# Patient Record
Sex: MALE | Race: WHITE | Employment: FULL TIME | URBAN - METROPOLITAN AREA
[De-identification: names, ages, dates, MRNs, and addresses within clinical notes are randomized per-mention and may not be internally consistent; named-entity substitution may affect disease eponyms.]

---

## 2023-09-06 ENCOUNTER — TELEPHONE (OUTPATIENT)
Dept: PRIMARY CARE | Facility: CLINIC | Age: 63
End: 2023-09-06
Payer: COMMERCIAL

## 2023-09-06 NOTE — TELEPHONE ENCOUNTER
Pt scheduled for comp pe on 11/9  BW    Pt will use our lab however requests order be mailed to him as well

## 2023-09-07 DIAGNOSIS — Z00.00 HEALTH MAINTENANCE EXAMINATION: ICD-10-CM

## 2023-09-07 PROBLEM — H91.10 PRESBYCUSIS: Status: ACTIVE | Noted: 2023-09-07

## 2023-09-07 PROBLEM — M54.12 RADICULITIS, CERVICAL: Status: ACTIVE | Noted: 2023-09-07

## 2023-09-07 PROBLEM — J01.00 ACUTE MAXILLARY SINUSITIS: Status: ACTIVE | Noted: 2023-09-07

## 2023-09-07 PROBLEM — H61.22 EXCESSIVE CERUMEN IN EAR CANAL, LEFT: Status: ACTIVE | Noted: 2023-09-07

## 2023-09-07 PROBLEM — R10.32 ABDOMINAL PAIN, LLQ (LEFT LOWER QUADRANT): Status: ACTIVE | Noted: 2023-09-07

## 2023-09-07 PROBLEM — E78.5 HYPERLIPIDEMIA: Status: ACTIVE | Noted: 2023-09-07

## 2023-09-07 PROBLEM — N40.0 BPH (BENIGN PROSTATIC HYPERPLASIA): Status: ACTIVE | Noted: 2023-09-07

## 2023-09-07 PROBLEM — J40 BRONCHITIS: Status: ACTIVE | Noted: 2023-09-07

## 2023-09-07 PROBLEM — R42 VERTIGO: Status: ACTIVE | Noted: 2023-09-07

## 2023-09-07 PROBLEM — H93.13 BILATERAL TINNITUS: Status: ACTIVE | Noted: 2023-09-07

## 2023-09-07 PROBLEM — J30.9 ALLERGIC RHINITIS: Status: ACTIVE | Noted: 2023-09-07

## 2023-09-07 PROBLEM — K57.92 ACUTE DIVERTICULITIS: Status: ACTIVE | Noted: 2023-09-07

## 2023-09-07 PROBLEM — H90.3 BILATERAL SENSORINEURAL HEARING LOSS: Status: ACTIVE | Noted: 2023-09-07

## 2023-09-07 PROBLEM — L02.92 BOIL: Status: ACTIVE | Noted: 2023-09-07

## 2023-09-07 PROBLEM — L82.1 SEBORRHEIC KERATOSIS: Status: ACTIVE | Noted: 2023-09-07

## 2023-09-07 RX ORDER — SIMVASTATIN 40 MG/1
1 TABLET, FILM COATED ORAL DAILY
COMMUNITY
Start: 2015-04-20 | End: 2023-10-03

## 2023-09-11 ENCOUNTER — OFFICE VISIT (OUTPATIENT)
Dept: PRIMARY CARE | Facility: CLINIC | Age: 63
End: 2023-09-11
Payer: COMMERCIAL

## 2023-09-11 VITALS
OXYGEN SATURATION: 98 % | BODY MASS INDEX: 30.1 KG/M2 | WEIGHT: 215 LBS | HEART RATE: 72 BPM | TEMPERATURE: 97.1 F | HEIGHT: 71 IN | DIASTOLIC BLOOD PRESSURE: 98 MMHG | SYSTOLIC BLOOD PRESSURE: 159 MMHG

## 2023-09-11 DIAGNOSIS — K57.92 ACUTE DIVERTICULITIS: Primary | ICD-10-CM

## 2023-09-11 PROCEDURE — 99213 OFFICE O/P EST LOW 20 MIN: CPT | Performed by: STUDENT IN AN ORGANIZED HEALTH CARE EDUCATION/TRAINING PROGRAM

## 2023-09-11 RX ORDER — CIPROFLOXACIN 500 MG/1
500 TABLET ORAL 2 TIMES DAILY
Qty: 14 TABLET | Refills: 0 | Status: SHIPPED | OUTPATIENT
Start: 2023-09-11 | End: 2023-09-18

## 2023-09-11 RX ORDER — METRONIDAZOLE 500 MG/1
500 TABLET ORAL 3 TIMES DAILY
Qty: 21 TABLET | Refills: 0 | Status: SHIPPED | OUTPATIENT
Start: 2023-09-11 | End: 2023-09-18

## 2023-09-11 ASSESSMENT — ENCOUNTER SYMPTOMS
CHILLS: 0
DIZZINESS: 0
ARTHRALGIAS: 0
VOMITING: 0
NAUSEA: 0
SHORTNESS OF BREATH: 0
RHINORRHEA: 0
PALPITATIONS: 0
CONSTIPATION: 0
COUGH: 0
LIGHT-HEADEDNESS: 0
ABDOMINAL PAIN: 1
HEADACHES: 0
FEVER: 0
DIARRHEA: 0
MYALGIAS: 0
FATIGUE: 0

## 2023-09-11 ASSESSMENT — PATIENT HEALTH QUESTIONNAIRE - PHQ9
2. FEELING DOWN, DEPRESSED OR HOPELESS: NOT AT ALL
SUM OF ALL RESPONSES TO PHQ9 QUESTIONS 1 AND 2: 0
1. LITTLE INTEREST OR PLEASURE IN DOING THINGS: NOT AT ALL

## 2023-09-11 NOTE — PROGRESS NOTES
"The Subjective   Patient ID: Praful Hilton is a 63 y.o. male who presents for diverticulitits (X Tumadeline).    HPI     He has been having a flare up of diverticulitis about a week ago.  He was on vacation. He states that he overate and thinks that is what did it.  The next day it was worsening. He did take an anibal seltzer and then was fasting a bit which has helped.  Currently it is nearly gone at this time.  Still pain in the lower left abdomen. Had been having more gas pressure which seems to have gotten a bit better.    He had diverticulitis several years ago and was diagnosed with full testing and then antibiotics.  He had it again a few years ago and was treated with antibiotics.    Review of Systems   Constitutional:  Negative for chills, fatigue and fever.   HENT:  Negative for congestion and rhinorrhea.    Respiratory:  Negative for cough and shortness of breath.    Cardiovascular:  Negative for chest pain and palpitations.   Gastrointestinal:  Positive for abdominal pain. Negative for constipation, diarrhea, nausea and vomiting.   Musculoskeletal:  Negative for arthralgias and myalgias.   Neurological:  Negative for dizziness, light-headedness and headaches.       Objective   BP (!) 159/98   Pulse 72   Temp 36.2 °C (97.1 °F)   Ht 1.803 m (5' 11\")   Wt 97.5 kg (215 lb)   SpO2 98%   BMI 29.99 kg/m²     Physical Exam  Vitals and nursing note reviewed.   Constitutional:       General: He is not in acute distress.     Appearance: Normal appearance. He is normal weight. He is not ill-appearing or toxic-appearing.   HENT:      Head: Normocephalic and atraumatic.   Cardiovascular:      Rate and Rhythm: Normal rate and regular rhythm.      Heart sounds: Normal heart sounds.   Pulmonary:      Effort: Pulmonary effort is normal.      Breath sounds: Normal breath sounds.   Abdominal:      General: Abdomen is flat. Bowel sounds are normal. There is no distension.      Palpations: Abdomen is soft.      Tenderness: " VIEW ALL There is abdominal tenderness (left lower quadrant mild). There is no guarding or rebound.   Neurological:      Mental Status: He is alert.         Assessment/Plan   Problem List Items Addressed This Visit       Acute diverticulitis - Primary    Relevant Medications    metroNIDAZOLE (Flagyl) 500 mg tablet    ciprofloxacin (Cipro) 500 mg tablet     Patient likely with episode of acute diverticulitis.  He is penicillin allergic.  Given and started on course of metronidazole with ciprofloxacin.  Patient may wait to start the medication as he has been making improvements on his own.  Discussed other dietary and lifestyle modifications as well.  Discussed and recommended that he take extra probiotics.  He will continue with regular follow-up.

## 2023-10-03 DIAGNOSIS — E78.5 HYPERLIPIDEMIA, UNSPECIFIED HYPERLIPIDEMIA TYPE: Primary | ICD-10-CM

## 2023-10-03 RX ORDER — SIMVASTATIN 40 MG/1
40 TABLET, FILM COATED ORAL DAILY
Qty: 90 TABLET | Refills: 3 | Status: SHIPPED | OUTPATIENT
Start: 2023-10-03

## 2023-10-31 ENCOUNTER — LAB (OUTPATIENT)
Dept: LAB | Facility: LAB | Age: 63
End: 2023-10-31
Payer: COMMERCIAL

## 2023-10-31 DIAGNOSIS — Z00.00 HEALTH MAINTENANCE EXAMINATION: ICD-10-CM

## 2023-10-31 PROCEDURE — 80061 LIPID PANEL: CPT

## 2023-10-31 PROCEDURE — 80053 COMPREHEN METABOLIC PANEL: CPT

## 2023-10-31 PROCEDURE — 84153 ASSAY OF PSA TOTAL: CPT

## 2023-10-31 PROCEDURE — 85025 COMPLETE CBC W/AUTO DIFF WBC: CPT

## 2023-10-31 PROCEDURE — 36415 COLL VENOUS BLD VENIPUNCTURE: CPT

## 2023-10-31 PROCEDURE — 84443 ASSAY THYROID STIM HORMONE: CPT

## 2023-11-01 LAB
ALBUMIN SERPL BCP-MCNC: 4.3 G/DL (ref 3.4–5)
ALP SERPL-CCNC: 62 U/L (ref 33–136)
ALT SERPL W P-5'-P-CCNC: 24 U/L (ref 10–52)
ANION GAP SERPL CALC-SCNC: 17 MMOL/L (ref 10–20)
AST SERPL W P-5'-P-CCNC: 20 U/L (ref 9–39)
BASOPHILS # BLD AUTO: 0.03 X10*3/UL (ref 0–0.1)
BASOPHILS NFR BLD AUTO: 0.5 %
BILIRUB SERPL-MCNC: 0.8 MG/DL (ref 0–1.2)
BUN SERPL-MCNC: 15 MG/DL (ref 6–23)
CALCIUM SERPL-MCNC: 9.7 MG/DL (ref 8.6–10.6)
CHLORIDE SERPL-SCNC: 103 MMOL/L (ref 98–107)
CHOLEST SERPL-MCNC: 178 MG/DL (ref 0–199)
CHOLESTEROL/HDL RATIO: 3
CO2 SERPL-SCNC: 23 MMOL/L (ref 21–32)
CREAT SERPL-MCNC: 0.93 MG/DL (ref 0.5–1.3)
EOSINOPHIL # BLD AUTO: 0.05 X10*3/UL (ref 0–0.7)
EOSINOPHIL NFR BLD AUTO: 0.9 %
ERYTHROCYTE [DISTWIDTH] IN BLOOD BY AUTOMATED COUNT: 12.4 % (ref 11.5–14.5)
GFR SERPL CREATININE-BSD FRML MDRD: >90 ML/MIN/1.73M*2
GLUCOSE SERPL-MCNC: 107 MG/DL (ref 74–99)
HCT VFR BLD AUTO: 46.3 % (ref 41–52)
HDLC SERPL-MCNC: 58.7 MG/DL
HGB BLD-MCNC: 15.1 G/DL (ref 13.5–17.5)
IMM GRANULOCYTES # BLD AUTO: 0.02 X10*3/UL (ref 0–0.7)
IMM GRANULOCYTES NFR BLD AUTO: 0.4 % (ref 0–0.9)
LDLC SERPL CALC-MCNC: 101 MG/DL
LYMPHOCYTES # BLD AUTO: 1.32 X10*3/UL (ref 1.2–4.8)
LYMPHOCYTES NFR BLD AUTO: 23.9 %
MCH RBC QN AUTO: 29.9 PG (ref 26–34)
MCHC RBC AUTO-ENTMCNC: 32.6 G/DL (ref 32–36)
MCV RBC AUTO: 92 FL (ref 80–100)
MONOCYTES # BLD AUTO: 0.6 X10*3/UL (ref 0.1–1)
MONOCYTES NFR BLD AUTO: 10.8 %
NEUTROPHILS # BLD AUTO: 3.51 X10*3/UL (ref 1.2–7.7)
NEUTROPHILS NFR BLD AUTO: 63.5 %
NON HDL CHOLESTEROL: 119 MG/DL (ref 0–149)
NRBC BLD-RTO: 0 /100 WBCS (ref 0–0)
PLATELET # BLD AUTO: 196 X10*3/UL (ref 150–450)
PMV BLD AUTO: 12 FL (ref 7.5–11.5)
POTASSIUM SERPL-SCNC: 4.2 MMOL/L (ref 3.5–5.3)
PROT SERPL-MCNC: 7.1 G/DL (ref 6.4–8.2)
PSA SERPL-MCNC: 4.86 NG/ML
RBC # BLD AUTO: 5.05 X10*6/UL (ref 4.5–5.9)
SODIUM SERPL-SCNC: 139 MMOL/L (ref 136–145)
TRIGL SERPL-MCNC: 93 MG/DL (ref 0–149)
TSH SERPL-ACNC: 1.38 MIU/L (ref 0.44–3.98)
VLDL: 19 MG/DL (ref 0–40)
WBC # BLD AUTO: 5.5 X10*3/UL (ref 4.4–11.3)

## 2023-11-09 ENCOUNTER — OFFICE VISIT (OUTPATIENT)
Dept: PRIMARY CARE | Facility: CLINIC | Age: 63
End: 2023-11-09
Payer: COMMERCIAL

## 2023-11-09 VITALS
SYSTOLIC BLOOD PRESSURE: 132 MMHG | HEIGHT: 71 IN | DIASTOLIC BLOOD PRESSURE: 81 MMHG | WEIGHT: 210 LBS | TEMPERATURE: 97.1 F | BODY MASS INDEX: 29.4 KG/M2 | HEART RATE: 60 BPM | OXYGEN SATURATION: 99 %

## 2023-11-09 DIAGNOSIS — H93.13 BILATERAL TINNITUS: ICD-10-CM

## 2023-11-09 DIAGNOSIS — H61.22 EXCESSIVE CERUMEN IN EAR CANAL, LEFT: ICD-10-CM

## 2023-11-09 DIAGNOSIS — Z00.00 ROUTINE ADULT HEALTH MAINTENANCE: ICD-10-CM

## 2023-11-09 DIAGNOSIS — R39.11 BENIGN PROSTATIC HYPERPLASIA WITH URINARY HESITANCY: ICD-10-CM

## 2023-11-09 DIAGNOSIS — E78.5 HYPERLIPIDEMIA, UNSPECIFIED HYPERLIPIDEMIA TYPE: ICD-10-CM

## 2023-11-09 DIAGNOSIS — R97.20 PSA ELEVATION: ICD-10-CM

## 2023-11-09 DIAGNOSIS — H91.13 PRESBYCUSIS OF BOTH EARS: ICD-10-CM

## 2023-11-09 DIAGNOSIS — N40.1 BENIGN PROSTATIC HYPERPLASIA WITH URINARY HESITANCY: ICD-10-CM

## 2023-11-09 DIAGNOSIS — Z00.00 ENCOUNTER FOR ROUTINE HISTORY AND PHYSICAL EXAM FOR MALE: Primary | ICD-10-CM

## 2023-11-09 DIAGNOSIS — H90.3 BILATERAL SENSORINEURAL HEARING LOSS: ICD-10-CM

## 2023-11-09 PROBLEM — L82.1 SEBORRHEIC KERATOSIS: Status: RESOLVED | Noted: 2023-09-07 | Resolved: 2023-11-09

## 2023-11-09 PROBLEM — K57.92 ACUTE DIVERTICULITIS: Status: RESOLVED | Noted: 2023-09-07 | Resolved: 2023-11-09

## 2023-11-09 PROBLEM — L02.92 BOIL: Status: RESOLVED | Noted: 2023-09-07 | Resolved: 2023-11-09

## 2023-11-09 PROBLEM — J01.00 ACUTE MAXILLARY SINUSITIS: Status: RESOLVED | Noted: 2023-09-07 | Resolved: 2023-11-09

## 2023-11-09 PROBLEM — R10.32 ABDOMINAL PAIN, LLQ (LEFT LOWER QUADRANT): Status: RESOLVED | Noted: 2023-09-07 | Resolved: 2023-11-09

## 2023-11-09 PROBLEM — M54.12 RADICULITIS, CERVICAL: Status: RESOLVED | Noted: 2023-09-07 | Resolved: 2023-11-09

## 2023-11-09 PROBLEM — J40 BRONCHITIS: Status: RESOLVED | Noted: 2023-09-07 | Resolved: 2023-11-09

## 2023-11-09 PROCEDURE — 93000 ELECTROCARDIOGRAM COMPLETE: CPT | Performed by: FAMILY MEDICINE

## 2023-11-09 PROCEDURE — 99396 PREV VISIT EST AGE 40-64: CPT | Performed by: FAMILY MEDICINE

## 2023-11-09 ASSESSMENT — ENCOUNTER SYMPTOMS
DIARRHEA: 0
DIZZINESS: 0
SINUS PAIN: 0
LOSS OF SENSATION IN FEET: 0
PALPITATIONS: 0
FATIGUE: 0
POLYDIPSIA: 0
ADENOPATHY: 0
OCCASIONAL FEELINGS OF UNSTEADINESS: 0
FREQUENCY: 0
GASTROINTESTINAL NEGATIVE: 1
DYSURIA: 0
CHEST TIGHTNESS: 0
FACIAL ASYMMETRY: 0
BLOOD IN STOOL: 0
HEMATURIA: 0
DIFFICULTY URINATING: 0
SORE THROAT: 0
WOUND: 0
MYALGIAS: 0
VOICE CHANGE: 0
EYE DISCHARGE: 0
DECREASED CONCENTRATION: 0
UNEXPECTED WEIGHT CHANGE: 0
SINUS PRESSURE: 0
APPETITE CHANGE: 0
SPEECH DIFFICULTY: 0
AGITATION: 0
COUGH: 0
SEIZURES: 0
ARTHRALGIAS: 0
SLEEP DISTURBANCE: 0
ABDOMINAL PAIN: 0
NUMBNESS: 0
DIAPHORESIS: 0
VOMITING: 0
BRUISES/BLEEDS EASILY: 0
EYE PAIN: 0
LIGHT-HEADEDNESS: 0
EYE ITCHING: 0
NAUSEA: 0
EYE REDNESS: 0
ANAL BLEEDING: 0
FLANK PAIN: 0
CARDIOVASCULAR NEGATIVE: 1
HEADACHES: 0
ABDOMINAL DISTENTION: 0
TROUBLE SWALLOWING: 0
DEPRESSION: 0
BACK PAIN: 0
NERVOUS/ANXIOUS: 0
CONSTIPATION: 0
CHILLS: 0
MUSCULOSKELETAL NEGATIVE: 1

## 2023-11-09 ASSESSMENT — COLUMBIA-SUICIDE SEVERITY RATING SCALE - C-SSRS
1. IN THE PAST MONTH, HAVE YOU WISHED YOU WERE DEAD OR WISHED YOU COULD GO TO SLEEP AND NOT WAKE UP?: NO
6. HAVE YOU EVER DONE ANYTHING, STARTED TO DO ANYTHING, OR PREPARED TO DO ANYTHING TO END YOUR LIFE?: NO
2. HAVE YOU ACTUALLY HAD ANY THOUGHTS OF KILLING YOURSELF?: NO

## 2023-11-09 ASSESSMENT — PATIENT HEALTH QUESTIONNAIRE - PHQ9
2. FEELING DOWN, DEPRESSED OR HOPELESS: NOT AT ALL
SUM OF ALL RESPONSES TO PHQ9 QUESTIONS 1 AND 2: 0
10. IF YOU CHECKED OFF ANY PROBLEMS, HOW DIFFICULT HAVE THESE PROBLEMS MADE IT FOR YOU TO DO YOUR WORK, TAKE CARE OF THINGS AT HOME, OR GET ALONG WITH OTHER PEOPLE: NOT DIFFICULT AT ALL
1. LITTLE INTEREST OR PLEASURE IN DOING THINGS: NOT AT ALL

## 2023-11-09 NOTE — PROGRESS NOTES
Subjective   Patient ID: Praful Hilton is a 63 y.o. male who presents for Annual Exam.    Patient presents for his physical.  Father with history of prostate cancer he now has slight elevation of PSA level.  States he gets up once at night to urinate noticed that the stream is slow down.    Patient had no troubles with chest pain.    States that he needs to get in better shape and notices that sometimes if he is raking leaves hard he could get a little winded.    He smokes cigars on rare occasion.    He has had no troubles with headache or double vision or blurry vision no sore throat no difficulty swallowing his had bilateral hearing difficulties.    No ringing in the ears.  No troubles with sore throats or difficulty swallowing.  No troubles with abdominal pain or discomfort no swelling of the legs or feet.  Up once at night to urinate.  Father side history of prostate cancer PSA level is slightly elevated.         Alcohol intake: 1-2 beers a day  Caffeine intake: 1 pot a day  Exercise: walking 20 minutes a day    Last Colonoscopy: 2020  Last Pap smear: N/A  Mammogram:N/A  Last Dexa scan:N/A  N/a  Shingles vaccine: recommended  TdaP vaccine:     Review of Systems   Constitutional:  Negative for appetite change, chills, diaphoresis, fatigue and unexpected weight change.   HENT:  Positive for hearing loss. Negative for congestion, dental problem, ear discharge, ear pain, mouth sores, nosebleeds, postnasal drip, sinus pressure, sinus pain, sore throat, tinnitus, trouble swallowing and voice change.    Eyes:  Negative for pain, discharge, redness, itching and visual disturbance.   Respiratory:  Negative for cough and chest tightness.    Cardiovascular: Negative.  Negative for chest pain, palpitations and leg swelling.   Gastrointestinal: Negative.  Negative for abdominal distention, abdominal pain, anal bleeding, blood in stool, constipation, diarrhea, nausea and vomiting.   Endocrine: Negative for cold intolerance,  "heat intolerance, polydipsia and polyuria.   Genitourinary:  Negative for difficulty urinating, dysuria, flank pain, frequency (1 time at night), hematuria, penile swelling, scrotal swelling and testicular pain.   Musculoskeletal: Negative.  Negative for arthralgias, back pain and myalgias.   Skin: Negative.  Negative for rash and wound.   Allergic/Immunologic: Negative for environmental allergies, food allergies and immunocompromised state.   Neurological:  Negative for dizziness, seizures, facial asymmetry, speech difficulty, light-headedness, numbness and headaches.   Hematological:  Negative for adenopathy. Does not bruise/bleed easily.   Psychiatric/Behavioral:  Negative for agitation, behavioral problems, decreased concentration, sleep disturbance and suicidal ideas. The patient is not nervous/anxious.        Objective   /82   Pulse 60   Temp 36.2 °C (97.1 °F)   Ht 1.791 m (5' 10.5\")   Wt 95.3 kg (210 lb)   SpO2 99%   BMI 29.71 kg/m²   BSA Body surface area is 2.18 meters squared.      Physical Exam  Constitutional:       General: He is not in acute distress.     Appearance: Normal appearance. He is normal weight. He is not ill-appearing or toxic-appearing.   HENT:      Head: Normocephalic.      Right Ear: Tympanic membrane normal.      Left Ear: Tympanic membrane normal. There is impacted cerumen.      Nose: Nose normal.      Mouth/Throat:      Mouth: Mucous membranes are dry.   Eyes:      Extraocular Movements: Extraocular movements intact.      Conjunctiva/sclera: Conjunctivae normal.      Pupils: Pupils are equal, round, and reactive to light.   Cardiovascular:      Rate and Rhythm: Normal rate and regular rhythm.      Pulses: Normal pulses.      Heart sounds: Normal heart sounds.   Pulmonary:      Effort: Pulmonary effort is normal.      Breath sounds: Normal breath sounds. No wheezing or rhonchi.   Abdominal:      General: Abdomen is flat. Bowel sounds are normal. There is no distension.      " Palpations: Abdomen is soft.      Tenderness: There is no abdominal tenderness. There is no right CVA tenderness or rebound.      Hernia: No hernia is present.   Genitourinary:     Penis: Normal.       Testes: Normal.      Comments: Enlargement of prostate noted  Musculoskeletal:         General: Normal range of motion.      Cervical back: Normal range of motion.      Right lower leg: No edema.   Skin:     General: Skin is warm and dry.      Capillary Refill: Capillary refill takes less than 2 seconds.      Findings: No bruising.   Neurological:      General: No focal deficit present.      Mental Status: He is alert and oriented to person, place, and time.      Cranial Nerves: No cranial nerve deficit.      Sensory: No sensory deficit.      Motor: No weakness.      Coordination: Coordination normal.      Gait: Gait normal.      Deep Tendon Reflexes: Reflexes normal.   Psychiatric:         Mood and Affect: Mood normal.         Behavior: Behavior normal.       Lab on 10/31/2023   Component Date Value Ref Range Status    WBC 10/31/2023 5.5  4.4 - 11.3 x10*3/uL Final    nRBC 10/31/2023 0.0  0.0 - 0.0 /100 WBCs Final    RBC 10/31/2023 5.05  4.50 - 5.90 x10*6/uL Final    Hemoglobin 10/31/2023 15.1  13.5 - 17.5 g/dL Final    Hematocrit 10/31/2023 46.3  41.0 - 52.0 % Final    MCV 10/31/2023 92  80 - 100 fL Final    MCH 10/31/2023 29.9  26.0 - 34.0 pg Final    MCHC 10/31/2023 32.6  32.0 - 36.0 g/dL Final    RDW 10/31/2023 12.4  11.5 - 14.5 % Final    Platelets 10/31/2023 196  150 - 450 x10*3/uL Final    MPV 10/31/2023 12.0 (H)  7.5 - 11.5 fL Final    Neutrophils % 10/31/2023 63.5  40.0 - 80.0 % Final    Immature Granulocytes %, Automated 10/31/2023 0.4  0.0 - 0.9 % Final    Immature Granulocyte Count (IG) includes promyelocytes, myelocytes and metamyelocytes but does not include bands. Percent differential counts (%) should be interpreted in the context of the absolute cell counts (cells/UL).    Lymphocytes % 10/31/2023 23.9   13.0 - 44.0 % Final    Monocytes % 10/31/2023 10.8  2.0 - 10.0 % Final    Eosinophils % 10/31/2023 0.9  0.0 - 6.0 % Final    Basophils % 10/31/2023 0.5  0.0 - 2.0 % Final    Neutrophils Absolute 10/31/2023 3.51  1.20 - 7.70 x10*3/uL Final    Percent differential counts (%) should be interpreted in the context of the absolute cell counts (cells/uL).    Immature Granulocytes Absolute, Au* 10/31/2023 0.02  0.00 - 0.70 x10*3/uL Final    Lymphocytes Absolute 10/31/2023 1.32  1.20 - 4.80 x10*3/uL Final    Monocytes Absolute 10/31/2023 0.60  0.10 - 1.00 x10*3/uL Final    Eosinophils Absolute 10/31/2023 0.05  0.00 - 0.70 x10*3/uL Final    Basophils Absolute 10/31/2023 0.03  0.00 - 0.10 x10*3/uL Final    Glucose 10/31/2023 107 (H)  74 - 99 mg/dL Final    Sodium 10/31/2023 139  136 - 145 mmol/L Final    Potassium 10/31/2023 4.2  3.5 - 5.3 mmol/L Final    Chloride 10/31/2023 103  98 - 107 mmol/L Final    Bicarbonate 10/31/2023 23  21 - 32 mmol/L Final    Anion Gap 10/31/2023 17  10 - 20 mmol/L Final    Urea Nitrogen 10/31/2023 15  6 - 23 mg/dL Final    Creatinine 10/31/2023 0.93  0.50 - 1.30 mg/dL Final    eGFR 10/31/2023 >90  >60 mL/min/1.73m*2 Final    Calculations of estimated GFR are performed using the 2021 CKD-EPI Study Refit equation without the race variable for the IDMS-Traceable creatinine methods.  https://jasn.asnjournals.org/content/early/2021/09/22/ASN.7992269648    Calcium 10/31/2023 9.7  8.6 - 10.6 mg/dL Final    Albumin 10/31/2023 4.3  3.4 - 5.0 g/dL Final    Alkaline Phosphatase 10/31/2023 62  33 - 136 U/L Final    Total Protein 10/31/2023 7.1  6.4 - 8.2 g/dL Final    AST 10/31/2023 20  9 - 39 U/L Final    Bilirubin, Total 10/31/2023 0.8  0.0 - 1.2 mg/dL Final    ALT 10/31/2023 24  10 - 52 U/L Final    Patients treated with Sulfasalazine may generate falsely decreased results for ALT.    Thyroid Stimulating Hormone 10/31/2023 1.38  0.44 - 3.98 mIU/L Final    Cholesterol 10/31/2023 178  0 - 199 mg/dL Final           Age      Desirable   Borderline High   High     0-19 Y     0 - 169       170 - 199     >/= 200    20-24 Y     0 - 189       190 - 224     >/= 225         >24 Y     0 - 199       200 - 239     >/= 240   **All ranges are based on fasting samples. Specific   therapeutic targets will vary based on patient-specific   cardiac risk.    Pediatric guidelines reference:Pediatrics 2011, 128(S5).Adult guidelines reference: NCEP ATPIII Guidelines,MAXIM 2001, 258:7776-97    Venipuncture immediately after or during the administration of Metamizole may lead to falsely low results. Testing should be performed immediately prior to Metamizole dosing.    HDL-Cholesterol 10/31/2023 58.7  mg/dL Final      Age       Very Low   Low     Normal    High    0-19 Y    < 35      < 40     40-45     ----  20-24 Y    ----     < 40      >45      ----        >24 Y      ----     < 40     40-60      >60      Cholesterol/HDL Ratio 10/31/2023 3.0   Final      Ref Values  Desirable  < 3.4  High Risk  > 5.0    LDL Calculated 10/31/2023 101 (H)  <=99 mg/dL Final                                Near   Borderline      AGE      Desirable  Optimal    High     High     Very High     0-19 Y     0 - 109     ---    110-129   >/= 130     ----    20-24 Y     0 - 119     ---    120-159   >/= 160     ----      >24 Y     0 -  99   100-129  130-159   160-189     >/=190      VLDL 10/31/2023 19  0 - 40 mg/dL Final    Triglycerides 10/31/2023 93  0 - 149 mg/dL Final       Age         Desirable   Borderline High   High     Very High   0 D-90 D    19 - 174         ----         ----        ----  91 D- 9 Y     0 -  74        75 -  99     >/= 100      ----    10-19 Y     0 -  89        90 - 129     >/= 130      ----    20-24 Y     0 - 114       115 - 149     >/= 150      ----         >24 Y     0 - 149       150 - 199    200- 499    >/= 500    Venipuncture immediately after or during the administration of Metamizole may lead to falsely low results. Testing should be performed  immediately prior to Metamizole dosing.    Non HDL Cholesterol 10/31/2023 119  0 - 149 mg/dL Final          Age       Desirable   Borderline High   High     Very High     0-19 Y     0 - 119       120 - 144     >/= 145    >/= 160    20-24 Y     0 - 149       150 - 189     >/= 190      ----         >24 Y    30 mg/dL above LDL Cholesterol goal      Prostate Specific AG 10/31/2023 4.86 (H)  <=4.00 ng/mL Final     Current Outpatient Medications on File Prior to Visit   Medication Sig Dispense Refill    simvastatin (Zocor) 40 mg tablet TAKE 1 TABLET DAILY 90 tablet 3     No current facility-administered medications on file prior to visit.     No images are attached to the encounter.            Assessment/Plan

## 2023-11-09 NOTE — PATIENT INSTRUCTIONS
Labs were reviewed today.  Would like for you to follow healthy diet.    EKG performed and reviewed and no acute abnormalities noted.    Because of history of hyperlipidemia going to have you do CT scoring of the coronary arteries for further evaluation.    PSA level is slightly elevated going to have you see urology specialist Dr. Lawrence.    Left ear has been irrigated.    Labs have been reviewed with you today.  Medications reviewed and reconciled.    Discussion about immunizations including shingles vaccination given information today.

## 2023-11-09 NOTE — ASSESSMENT & PLAN NOTE
PSA level slightly elevated because of family history with her father having prostate cancer going to have you see Dr. Lawrence urology specialist

## 2023-11-09 NOTE — ASSESSMENT & PLAN NOTE
Please continue cholesterol-lowering medicine please continue on simvastatin therapy.  Cholesterol levels close to goal going to order CT scoring of the coronary arteries    EKG performed no acute abnormalities

## 2023-11-16 ENCOUNTER — DOCUMENTATION (OUTPATIENT)
Dept: AUDIOLOGY | Facility: CLINIC | Age: 63
End: 2023-11-16
Payer: COMMERCIAL

## 2023-11-16 NOTE — PROGRESS NOTES
Pebbles Aguila, AuD 11/16/2023 11:32 AM EST  Received repaired hearing aid.  Listening check good.  Called and spoke to patient.  He is out of town but will pick it up early next week.  ------------------------------------  Pebbles Aguila, AuD 11/10/2023 04:18 PM EST  Patient picked up the repaired left hearing aid and dropped off the right hearing aid for the same thing.  Sent to [employer] for repair.  ------------------------------------  Pebbles Aguila, AuD 11/10/2023 10:51 AM EST  Received repaired left aid.  Programmed to most recent settings (new SN) but did not save to database as a monaural fitting.  Listening check good.  Called and LM to .  No charge/courtesy per [employer].  ------------------------------------  Pebbles Aguila, AuD 11/6/2023 02:57 PM EST  Sent left aid to [employer] for repair with a courtesy no charge per .

## 2023-11-21 ENCOUNTER — CLINICAL SUPPORT (OUTPATIENT)
Dept: AUDIOLOGY | Facility: CLINIC | Age: 63
End: 2023-11-21

## 2023-11-21 DIAGNOSIS — H90.3 BILATERAL SENSORINEURAL HEARING LOSS: Primary | ICD-10-CM

## 2023-11-21 NOTE — PROGRESS NOTES
[facility]  HEARING AID CHECK      Name:  Praful Hilton  YOB: 1960  Today's date:  11/21/23      PATIENT ISSUES/CONCERNS AND OTOSCOPIC RESULTS  Otoscopic was clear.  Patient reports that the hearing aids will not pair to his phone binaurally since the most recent repair.    HEARING AID INSPECTION AND ADJUSTMENT  Programmed the hearing aids in the [employer] software together.  Verified program settings and verified ear-to-ear communication.    Re-paired both aids to the patient's phone and to the Smart [employer].  Verified functionality in the office.    FOLLOW UP   The patient was asked to contact the office if they notice any significant change in hearing level or hearing aid function.    Otherwise, will follow up again in February as previously scheduled.    APPOINTMENT TIME  3:30-4:00pm    Walt Wade  Doctor of Audiology  Licensed Audiologist

## 2023-11-27 ENCOUNTER — APPOINTMENT (OUTPATIENT)
Dept: AUDIOLOGY | Facility: CLINIC | Age: 63
End: 2023-11-27
Payer: COMMERCIAL

## 2024-01-10 ENCOUNTER — OFFICE VISIT (OUTPATIENT)
Dept: PRIMARY CARE | Facility: CLINIC | Age: 64
End: 2024-01-10
Payer: COMMERCIAL

## 2024-01-10 ENCOUNTER — LAB (OUTPATIENT)
Dept: LAB | Facility: LAB | Age: 64
End: 2024-01-10
Payer: COMMERCIAL

## 2024-01-10 ENCOUNTER — ANCILLARY PROCEDURE (OUTPATIENT)
Dept: RADIOLOGY | Facility: CLINIC | Age: 64
End: 2024-01-10
Payer: COMMERCIAL

## 2024-01-10 VITALS
DIASTOLIC BLOOD PRESSURE: 94 MMHG | BODY MASS INDEX: 29.85 KG/M2 | SYSTOLIC BLOOD PRESSURE: 152 MMHG | RESPIRATION RATE: 16 BRPM | OXYGEN SATURATION: 96 % | WEIGHT: 211 LBS | TEMPERATURE: 98 F | HEART RATE: 64 BPM

## 2024-01-10 DIAGNOSIS — R10.32 LLQ PAIN: ICD-10-CM

## 2024-01-10 DIAGNOSIS — R10.32 LLQ PAIN: Primary | ICD-10-CM

## 2024-01-10 PROCEDURE — 74177 CT ABD & PELVIS W/CONTRAST: CPT | Mod: FOREIGN READ | Performed by: RADIOLOGY

## 2024-01-10 PROCEDURE — 99213 OFFICE O/P EST LOW 20 MIN: CPT | Performed by: NURSE PRACTITIONER

## 2024-01-10 PROCEDURE — 74177 CT ABD & PELVIS W/CONTRAST: CPT

## 2024-01-10 PROCEDURE — 36415 COLL VENOUS BLD VENIPUNCTURE: CPT

## 2024-01-10 PROCEDURE — 2550000001 HC RX 255 CONTRASTS: Performed by: NURSE PRACTITIONER

## 2024-01-10 RX ORDER — METRONIDAZOLE 500 MG/1
500 TABLET ORAL 3 TIMES DAILY
COMMUNITY
End: 2024-05-02 | Stop reason: ALTCHOICE

## 2024-01-10 RX ORDER — CIPROFLOXACIN 500 MG/1
500 TABLET ORAL 2 TIMES DAILY
COMMUNITY
End: 2024-05-02 | Stop reason: ALTCHOICE

## 2024-01-10 RX ADMIN — IOHEXOL 75 ML: 350 INJECTION, SOLUTION INTRAVENOUS at 11:12

## 2024-01-10 ASSESSMENT — ENCOUNTER SYMPTOMS
NAUSEA: 0
DEPRESSION: 0
DIARRHEA: 0
ABDOMINAL PAIN: 1
OCCASIONAL FEELINGS OF UNSTEADINESS: 0
COUGH: 0
FEVER: 0
VOMITING: 0
CHILLS: 0
LOSS OF SENSATION IN FEET: 0
ABDOMINAL DISTENTION: 1
SHORTNESS OF BREATH: 0

## 2024-01-10 ASSESSMENT — PATIENT HEALTH QUESTIONNAIRE - PHQ9
SUM OF ALL RESPONSES TO PHQ9 QUESTIONS 1 AND 2: 0
2. FEELING DOWN, DEPRESSED OR HOPELESS: NOT AT ALL
1. LITTLE INTEREST OR PLEASURE IN DOING THINGS: NOT AT ALL
10. IF YOU CHECKED OFF ANY PROBLEMS, HOW DIFFICULT HAVE THESE PROBLEMS MADE IT FOR YOU TO DO YOUR WORK, TAKE CARE OF THINGS AT HOME, OR GET ALONG WITH OTHER PEOPLE: NOT DIFFICULT AT ALL

## 2024-01-10 NOTE — ASSESSMENT & PLAN NOTE
CT abd/pelvis ordered  Advised patient to start flagyl and cipro   Patient to continue full liquid diet

## 2024-01-10 NOTE — PATIENT INSTRUCTIONS
We are going to obtain a CT scan of your abdomen and pelvis today  We will call you with the results  Recommend full liquid diet for another 24 hours and then may advance slowly   You may start the antibiotics that you have (Flagyl and Cipro)   Follow up in 1 week to ensure improvement in your symptoms

## 2024-01-10 NOTE — PROGRESS NOTES
Subjective   Chief Complaint: diverticulitis flare up  (Started last night).    PIPPA Hilton is a 63 y.o. male who presents for diverticulitis flare up  (Started last night).    Patient presents with LLQ pain that started yesterday a few hours after lunch. Pain became more severe at around 5pm yesterday. He has been avoiding foods since then. He has been drinking water.     He was given Flagyl and Cipro a few months ago when he thought he had a flare but his symptoms improved so he never ended up taking the antibiotics. He brought the bottles with him today.       Patient denies fever, chills, nausea, vomiting, diarrhea, chest pain,  or shortness of breath.       Review of Systems   Constitutional:  Negative for chills and fever.   Respiratory:  Negative for cough and shortness of breath.    Cardiovascular:  Negative for chest pain.   Gastrointestinal:  Positive for abdominal distention and abdominal pain. Negative for diarrhea, nausea and vomiting.       Objective   BP (!) 152/94   Pulse 64   Temp 36.7 °C (98 °F)   Resp 16   Wt 95.7 kg (211 lb)   SpO2 96%   BMI 29.85 kg/m²   BSA Body surface area is 2.18 meters squared.      Physical Exam  Constitutional:       Appearance: Normal appearance.   Cardiovascular:      Rate and Rhythm: Normal rate and regular rhythm.   Pulmonary:      Effort: Pulmonary effort is normal.      Breath sounds: Normal breath sounds.   Abdominal:      General: Abdomen is flat.      Palpations: There is no mass.      Tenderness: There is abdominal tenderness. There is guarding and rebound.      Hernia: No hernia is present.   Neurological:      Mental Status: He is alert.       Lab on 10/31/2023   Component Date Value Ref Range Status    WBC 10/31/2023 5.5  4.4 - 11.3 x10*3/uL Final    nRBC 10/31/2023 0.0  0.0 - 0.0 /100 WBCs Final    RBC 10/31/2023 5.05  4.50 - 5.90 x10*6/uL Final    Hemoglobin 10/31/2023 15.1  13.5 - 17.5 g/dL Final    Hematocrit 10/31/2023 46.3  41.0 - 52.0 %  Final    MCV 10/31/2023 92  80 - 100 fL Final    MCH 10/31/2023 29.9  26.0 - 34.0 pg Final    MCHC 10/31/2023 32.6  32.0 - 36.0 g/dL Final    RDW 10/31/2023 12.4  11.5 - 14.5 % Final    Platelets 10/31/2023 196  150 - 450 x10*3/uL Final    MPV 10/31/2023 12.0 (H)  7.5 - 11.5 fL Final    Neutrophils % 10/31/2023 63.5  40.0 - 80.0 % Final    Immature Granulocytes %, Automated 10/31/2023 0.4  0.0 - 0.9 % Final    Immature Granulocyte Count (IG) includes promyelocytes, myelocytes and metamyelocytes but does not include bands. Percent differential counts (%) should be interpreted in the context of the absolute cell counts (cells/UL).    Lymphocytes % 10/31/2023 23.9  13.0 - 44.0 % Final    Monocytes % 10/31/2023 10.8  2.0 - 10.0 % Final    Eosinophils % 10/31/2023 0.9  0.0 - 6.0 % Final    Basophils % 10/31/2023 0.5  0.0 - 2.0 % Final    Neutrophils Absolute 10/31/2023 3.51  1.20 - 7.70 x10*3/uL Final    Percent differential counts (%) should be interpreted in the context of the absolute cell counts (cells/uL).    Immature Granulocytes Absolute, Au* 10/31/2023 0.02  0.00 - 0.70 x10*3/uL Final    Lymphocytes Absolute 10/31/2023 1.32  1.20 - 4.80 x10*3/uL Final    Monocytes Absolute 10/31/2023 0.60  0.10 - 1.00 x10*3/uL Final    Eosinophils Absolute 10/31/2023 0.05  0.00 - 0.70 x10*3/uL Final    Basophils Absolute 10/31/2023 0.03  0.00 - 0.10 x10*3/uL Final    Glucose 10/31/2023 107 (H)  74 - 99 mg/dL Final    Sodium 10/31/2023 139  136 - 145 mmol/L Final    Potassium 10/31/2023 4.2  3.5 - 5.3 mmol/L Final    Chloride 10/31/2023 103  98 - 107 mmol/L Final    Bicarbonate 10/31/2023 23  21 - 32 mmol/L Final    Anion Gap 10/31/2023 17  10 - 20 mmol/L Final    Urea Nitrogen 10/31/2023 15  6 - 23 mg/dL Final    Creatinine 10/31/2023 0.93  0.50 - 1.30 mg/dL Final    eGFR 10/31/2023 >90  >60 mL/min/1.73m*2 Final    Calculations of estimated GFR are performed using the 2021 CKD-EPI Study Refit equation without the race variable  for the IDMS-Traceable creatinine methods.  https://jasn.asnjournals.org/content/early/2021/09/22/ASN.6494741708    Calcium 10/31/2023 9.7  8.6 - 10.6 mg/dL Final    Albumin 10/31/2023 4.3  3.4 - 5.0 g/dL Final    Alkaline Phosphatase 10/31/2023 62  33 - 136 U/L Final    Total Protein 10/31/2023 7.1  6.4 - 8.2 g/dL Final    AST 10/31/2023 20  9 - 39 U/L Final    Bilirubin, Total 10/31/2023 0.8  0.0 - 1.2 mg/dL Final    ALT 10/31/2023 24  10 - 52 U/L Final    Patients treated with Sulfasalazine may generate falsely decreased results for ALT.    Thyroid Stimulating Hormone 10/31/2023 1.38  0.44 - 3.98 mIU/L Final    Cholesterol 10/31/2023 178  0 - 199 mg/dL Final          Age      Desirable   Borderline High   High     0-19 Y     0 - 169       170 - 199     >/= 200    20-24 Y     0 - 189       190 - 224     >/= 225         >24 Y     0 - 199       200 - 239     >/= 240   **All ranges are based on fasting samples. Specific   therapeutic targets will vary based on patient-specific   cardiac risk.    Pediatric guidelines reference:Pediatrics 2011, 128(S5).Adult guidelines reference: NCEP ATPIII Guidelines,MAXIM 2001, 258:2486-97    Venipuncture immediately after or during the administration of Metamizole may lead to falsely low results. Testing should be performed immediately prior to Metamizole dosing.    HDL-Cholesterol 10/31/2023 58.7  mg/dL Final      Age       Very Low   Low     Normal    High    0-19 Y    < 35      < 40     40-45     ----  20-24 Y    ----     < 40      >45      ----        >24 Y      ----     < 40     40-60      >60      Cholesterol/HDL Ratio 10/31/2023 3.0   Final      Ref Values  Desirable  < 3.4  High Risk  > 5.0    LDL Calculated 10/31/2023 101 (H)  <=99 mg/dL Final                                Near   Borderline      AGE      Desirable  Optimal    High     High     Very High     0-19 Y     0 - 109     ---    110-129   >/= 130     ----    20-24 Y     0 - 119     ---    120-159   >/= 160      ----      >24 Y     0 -  99   100-129  130-159   160-189     >/=190      VLDL 10/31/2023 19  0 - 40 mg/dL Final    Triglycerides 10/31/2023 93  0 - 149 mg/dL Final       Age         Desirable   Borderline High   High     Very High   0 D-90 D    19 - 174         ----         ----        ----  91 D- 9 Y     0 -  74        75 -  99     >/= 100      ----    10-19 Y     0 -  89        90 - 129     >/= 130      ----    20-24 Y     0 - 114       115 - 149     >/= 150      ----         >24 Y     0 - 149       150 - 199    200- 499    >/= 500    Venipuncture immediately after or during the administration of Metamizole may lead to falsely low results. Testing should be performed immediately prior to Metamizole dosing.    Non HDL Cholesterol 10/31/2023 119  0 - 149 mg/dL Final          Age       Desirable   Borderline High   High     Very High     0-19 Y     0 - 119       120 - 144     >/= 145    >/= 160    20-24 Y     0 - 149       150 - 189     >/= 190      ----         >24 Y    30 mg/dL above LDL Cholesterol goal      Prostate Specific AG 10/31/2023 4.86 (H)  <=4.00 ng/mL Final     Current Outpatient Medications on File Prior to Visit   Medication Sig Dispense Refill    simvastatin (Zocor) 40 mg tablet TAKE 1 TABLET DAILY 90 tablet 3     No current facility-administered medications on file prior to visit.     No images are attached to the encounter.            Assessment/Plan   Problem List Items Addressed This Visit             ICD-10-CM    LLQ pain - Primary R10.32     CT abd/pelvis ordered  Advised patient to start flagyl and cipro   Patient to continue full liquid diet          Relevant Orders    CT abdomen pelvis w IV contrast    Creatinine, Serum

## 2024-01-19 ENCOUNTER — OFFICE VISIT (OUTPATIENT)
Dept: PRIMARY CARE | Facility: CLINIC | Age: 64
End: 2024-01-19
Payer: COMMERCIAL

## 2024-01-19 VITALS
DIASTOLIC BLOOD PRESSURE: 89 MMHG | HEART RATE: 60 BPM | SYSTOLIC BLOOD PRESSURE: 148 MMHG | OXYGEN SATURATION: 98 % | WEIGHT: 214.25 LBS | BODY MASS INDEX: 30.31 KG/M2

## 2024-01-19 DIAGNOSIS — K57.92 ACUTE DIVERTICULITIS: Primary | ICD-10-CM

## 2024-01-19 DIAGNOSIS — R35.0 FREQUENCY OF URINATION: ICD-10-CM

## 2024-01-19 LAB
POC APPEARANCE, URINE: CLEAR
POC BILIRUBIN, URINE: NEGATIVE
POC BLOOD, URINE: NEGATIVE
POC COLOR, URINE: YELLOW
POC GLUCOSE, URINE: NEGATIVE MG/DL
POC KETONES, URINE: NEGATIVE MG/DL
POC LEUKOCYTES, URINE: NEGATIVE
POC NITRITE,URINE: NEGATIVE
POC PH, URINE: 6 PH
POC PROTEIN, URINE: NEGATIVE MG/DL
POC SPECIFIC GRAVITY, URINE: 1.02
POC UROBILINOGEN, URINE: 0.2 EU/DL

## 2024-01-19 PROCEDURE — 99213 OFFICE O/P EST LOW 20 MIN: CPT | Performed by: NURSE PRACTITIONER

## 2024-01-19 PROCEDURE — 87086 URINE CULTURE/COLONY COUNT: CPT

## 2024-01-19 PROCEDURE — 81003 URINALYSIS AUTO W/O SCOPE: CPT | Performed by: NURSE PRACTITIONER

## 2024-01-19 ASSESSMENT — ENCOUNTER SYMPTOMS
FEVER: 0
CHILLS: 0
COUGH: 0
SHORTNESS OF BREATH: 0
NAUSEA: 0
ABDOMINAL PAIN: 0
DIARRHEA: 0
FREQUENCY: 0
VOMITING: 0
FLANK PAIN: 0
WHEEZING: 0

## 2024-01-19 NOTE — ASSESSMENT & PLAN NOTE
Completed flagyl and cipro  Reports improvement in symptoms   Continue to monitor -- patient to return if symptoms return

## 2024-01-19 NOTE — PATIENT INSTRUCTIONS
We will call you with the results of your urine culture  Follow up if your symptoms worsen or return.

## 2024-01-19 NOTE — PROGRESS NOTES
Subjective   Chief Complaint: Follow-up.    PIPPA Hilton is a 63 y.o. male who presents for Follow-up.    Patient presents for follow up regarding his diverticulitis. Reports that he completed his antibiotics 2 days ago. He denies pain and reports that he has been consuming a regular diet.     CT abd/pelvis results reviewed with patient -- Cystitis seen on imaging. Patient completed course of cipro. Given the fact that he did not have urinary symptoms as time of diagnosis we will obtain urine culture today    Patient denies fever, chills, nausea, vomiting, diarrhea, chest pain, heart palpations, or shortness of breath.       Review of Systems   Constitutional:  Negative for chills and fever.   Respiratory:  Negative for cough, shortness of breath and wheezing.    Cardiovascular:  Negative for chest pain.   Gastrointestinal:  Negative for abdominal pain, diarrhea, nausea and vomiting.   Genitourinary:  Negative for flank pain, frequency and urgency.       Objective   /89   Pulse 60   Wt 97.2 kg (214 lb 4 oz)   SpO2 98%   BMI 30.31 kg/m²   BSA Body surface area is 2.2 meters squared.      Physical Exam  Constitutional:       Appearance: Normal appearance.   Cardiovascular:      Rate and Rhythm: Normal rate and regular rhythm.   Pulmonary:      Effort: Pulmonary effort is normal.      Breath sounds: Normal breath sounds.   Abdominal:      General: Abdomen is flat.      Palpations: Abdomen is soft.   Neurological:      Mental Status: He is alert.       Lab on 10/31/2023   Component Date Value Ref Range Status    WBC 10/31/2023 5.5  4.4 - 11.3 x10*3/uL Final    nRBC 10/31/2023 0.0  0.0 - 0.0 /100 WBCs Final    RBC 10/31/2023 5.05  4.50 - 5.90 x10*6/uL Final    Hemoglobin 10/31/2023 15.1  13.5 - 17.5 g/dL Final    Hematocrit 10/31/2023 46.3  41.0 - 52.0 % Final    MCV 10/31/2023 92  80 - 100 fL Final    MCH 10/31/2023 29.9  26.0 - 34.0 pg Final    MCHC 10/31/2023 32.6  32.0 - 36.0 g/dL Final    RDW  10/31/2023 12.4  11.5 - 14.5 % Final    Platelets 10/31/2023 196  150 - 450 x10*3/uL Final    MPV 10/31/2023 12.0 (H)  7.5 - 11.5 fL Final    Neutrophils % 10/31/2023 63.5  40.0 - 80.0 % Final    Immature Granulocytes %, Automated 10/31/2023 0.4  0.0 - 0.9 % Final    Immature Granulocyte Count (IG) includes promyelocytes, myelocytes and metamyelocytes but does not include bands. Percent differential counts (%) should be interpreted in the context of the absolute cell counts (cells/UL).    Lymphocytes % 10/31/2023 23.9  13.0 - 44.0 % Final    Monocytes % 10/31/2023 10.8  2.0 - 10.0 % Final    Eosinophils % 10/31/2023 0.9  0.0 - 6.0 % Final    Basophils % 10/31/2023 0.5  0.0 - 2.0 % Final    Neutrophils Absolute 10/31/2023 3.51  1.20 - 7.70 x10*3/uL Final    Percent differential counts (%) should be interpreted in the context of the absolute cell counts (cells/uL).    Immature Granulocytes Absolute, Au* 10/31/2023 0.02  0.00 - 0.70 x10*3/uL Final    Lymphocytes Absolute 10/31/2023 1.32  1.20 - 4.80 x10*3/uL Final    Monocytes Absolute 10/31/2023 0.60  0.10 - 1.00 x10*3/uL Final    Eosinophils Absolute 10/31/2023 0.05  0.00 - 0.70 x10*3/uL Final    Basophils Absolute 10/31/2023 0.03  0.00 - 0.10 x10*3/uL Final    Glucose 10/31/2023 107 (H)  74 - 99 mg/dL Final    Sodium 10/31/2023 139  136 - 145 mmol/L Final    Potassium 10/31/2023 4.2  3.5 - 5.3 mmol/L Final    Chloride 10/31/2023 103  98 - 107 mmol/L Final    Bicarbonate 10/31/2023 23  21 - 32 mmol/L Final    Anion Gap 10/31/2023 17  10 - 20 mmol/L Final    Urea Nitrogen 10/31/2023 15  6 - 23 mg/dL Final    Creatinine 10/31/2023 0.93  0.50 - 1.30 mg/dL Final    eGFR 10/31/2023 >90  >60 mL/min/1.73m*2 Final    Calculations of estimated GFR are performed using the 2021 CKD-EPI Study Refit equation without the race variable for the IDMS-Traceable creatinine methods.  https://jasn.asnjournals.org/content/early/2021/09/22/ASN.5913166930    Calcium 10/31/2023 9.7  8.6 -  10.6 mg/dL Final    Albumin 10/31/2023 4.3  3.4 - 5.0 g/dL Final    Alkaline Phosphatase 10/31/2023 62  33 - 136 U/L Final    Total Protein 10/31/2023 7.1  6.4 - 8.2 g/dL Final    AST 10/31/2023 20  9 - 39 U/L Final    Bilirubin, Total 10/31/2023 0.8  0.0 - 1.2 mg/dL Final    ALT 10/31/2023 24  10 - 52 U/L Final    Patients treated with Sulfasalazine may generate falsely decreased results for ALT.    Thyroid Stimulating Hormone 10/31/2023 1.38  0.44 - 3.98 mIU/L Final    Cholesterol 10/31/2023 178  0 - 199 mg/dL Final          Age      Desirable   Borderline High   High     0-19 Y     0 - 169       170 - 199     >/= 200    20-24 Y     0 - 189       190 - 224     >/= 225         >24 Y     0 - 199       200 - 239     >/= 240   **All ranges are based on fasting samples. Specific   therapeutic targets will vary based on patient-specific   cardiac risk.    Pediatric guidelines reference:Pediatrics 2011, 128(S5).Adult guidelines reference: NCEP ATPIII Guidelines,MAXIM 2001, 258:2486-97    Venipuncture immediately after or during the administration of Metamizole may lead to falsely low results. Testing should be performed immediately prior to Metamizole dosing.    HDL-Cholesterol 10/31/2023 58.7  mg/dL Final      Age       Very Low   Low     Normal    High    0-19 Y    < 35      < 40     40-45     ----  20-24 Y    ----     < 40      >45      ----        >24 Y      ----     < 40     40-60      >60      Cholesterol/HDL Ratio 10/31/2023 3.0   Final      Ref Values  Desirable  < 3.4  High Risk  > 5.0    LDL Calculated 10/31/2023 101 (H)  <=99 mg/dL Final                                Near   Borderline      AGE      Desirable  Optimal    High     High     Very High     0-19 Y     0 - 109     ---    110-129   >/= 130     ----    20-24 Y     0 - 119     ---    120-159   >/= 160     ----      >24 Y     0 -  99   100-129  130-159   160-189     >/=190      VLDL 10/31/2023 19  0 - 40 mg/dL Final    Triglycerides 10/31/2023 93  0 -  149 mg/dL Final       Age         Desirable   Borderline High   High     Very High   0 D-90 D    19 - 174         ----         ----        ----  91 D- 9 Y     0 -  74        75 -  99     >/= 100      ----    10-19 Y     0 -  89        90 - 129     >/= 130      ----    20-24 Y     0 - 114       115 - 149     >/= 150      ----         >24 Y     0 - 149       150 - 199    200- 499    >/= 500    Venipuncture immediately after or during the administration of Metamizole may lead to falsely low results. Testing should be performed immediately prior to Metamizole dosing.    Non HDL Cholesterol 10/31/2023 119  0 - 149 mg/dL Final          Age       Desirable   Borderline High   High     Very High     0-19 Y     0 - 119       120 - 144     >/= 145    >/= 160    20-24 Y     0 - 149       150 - 189     >/= 190      ----         >24 Y    30 mg/dL above LDL Cholesterol goal      Prostate Specific AG 10/31/2023 4.86 (H)  <=4.00 ng/mL Final     Current Outpatient Medications on File Prior to Visit   Medication Sig Dispense Refill    simvastatin (Zocor) 40 mg tablet TAKE 1 TABLET DAILY 90 tablet 3    ciprofloxacin (Cipro) 500 mg tablet Take 1 tablet (500 mg) by mouth 2 times a day.      metroNIDAZOLE (Flagyl) 500 mg tablet Take 1 tablet (500 mg) by mouth 3 times a day.       No current facility-administered medications on file prior to visit.     No images are attached to the encounter.            Assessment/Plan   Problem List Items Addressed This Visit             ICD-10-CM    Acute diverticulitis - Primary K57.92     Completed flagyl and cipro  Reports improvement in symptoms   Continue to monitor -- patient to return if symptoms return           Other Visit Diagnoses         Codes    Frequency of urination     R35.0    Relevant Orders    Urine Culture    POCT UA Automated manually resulted

## 2024-01-21 LAB — BACTERIA UR CULT: NO GROWTH

## 2024-02-22 ENCOUNTER — CLINICAL SUPPORT (OUTPATIENT)
Dept: AUDIOLOGY | Facility: CLINIC | Age: 64
End: 2024-02-22
Payer: COMMERCIAL

## 2024-02-22 DIAGNOSIS — H93.13 SUBJECTIVE TINNITUS OF BOTH EARS: ICD-10-CM

## 2024-02-22 DIAGNOSIS — H90.3 BILATERAL SENSORINEURAL HEARING LOSS: Primary | ICD-10-CM

## 2024-02-22 PROCEDURE — 92557 COMPREHENSIVE HEARING TEST: CPT | Performed by: AUDIOLOGIST

## 2024-02-22 NOTE — PROGRESS NOTES
[facility]  YEARLY HEARING AID APPOINTMENT      Name:  Praful Hilton  YOB: 1960  Today's date:  02/22/24      AUDIOMETRIC RESULTS  Otoscopic was mostly clear.  Audiogram revealed a stable normal to profound sensorineural hearing loss.    HEARING AID INSPECTION AND ADJUSTMENT  Hearing aids were cleaned and checked.  Replaced filters, domes and sport locks.  Listening check was weak again.  Tried to clean microphones with minimal improvement.     Discussed options (repair vs replace).  Patient is very upset.  I advised the patient that I will reach out to my [employer] for advice.     FOLLOW UP   I will contact the patient once I hear from [employer].    APPOINTMENT TIME  8:30-9:30am    Walt Wade  Doctor of Audiology  Licensed Audiologist

## 2024-02-23 ENCOUNTER — CLINICAL SUPPORT (OUTPATIENT)
Dept: AUDIOLOGY | Facility: CLINIC | Age: 64
End: 2024-02-23
Payer: COMMERCIAL

## 2024-02-23 NOTE — PROGRESS NOTES
[facility]  WALK IN  HEARING AID CHECK      Name:  Praful Hilton  YOB: 1960  Today's date:  02/23/24    Patient dropped off both aids to be sent to [employer] for repair.        Fuentes Wade.  Doctor of Audiology  Licensed Audiologist

## 2024-02-29 ENCOUNTER — DOCUMENTATION (OUTPATIENT)
Dept: AUDIOLOGY | Facility: CLINIC | Age: 64
End: 2024-02-29
Payer: COMMERCIAL

## 2024-02-29 NOTE — PROGRESS NOTES
Pebbles Aguila, AuD 2/29/2024 10:29 AM EST  Received repaired hearing aids.  Verified programming in software.  Listening check good.  Called patient to .  No charge.  ------------------------------------  Pebbles Aguila, AuD 2/23/2024 08:44 AM EST  Sent both hearing aids to [employer] for no charge repair.  ------------------------------------  Pebbles Aguila, AuD 2/22/2024 12:10 PM EST  Called patient and LM on  re: sending hearing aids to repair.  ------------------------------------  Pebbles Aguila, AuD 2/22/2024 12:10 PM EST  JJ from [employer] ok'd no charge repair using auth #[id number]  ------------------------------------  Pebbles Aguila, AuD 2/22/2024 09:14 AM EST  [employer] hearing aids are weak again.  Emailed JJ at [facility] [place] for potential resolution.  I will call patient when I receive a response.

## 2024-04-09 ENCOUNTER — HOSPITAL ENCOUNTER (OUTPATIENT)
Dept: RADIOLOGY | Facility: CLINIC | Age: 64
Discharge: HOME | End: 2024-04-09
Payer: COMMERCIAL

## 2024-04-09 DIAGNOSIS — E78.5 HYPERLIPIDEMIA, UNSPECIFIED HYPERLIPIDEMIA TYPE: ICD-10-CM

## 2024-04-09 PROCEDURE — 75571 CT HRT W/O DYE W/CA TEST: CPT

## 2024-05-02 ENCOUNTER — OFFICE VISIT (OUTPATIENT)
Dept: PRIMARY CARE | Facility: CLINIC | Age: 64
End: 2024-05-02
Payer: COMMERCIAL

## 2024-05-02 VITALS
BODY MASS INDEX: 30.38 KG/M2 | OXYGEN SATURATION: 98 % | HEIGHT: 71 IN | DIASTOLIC BLOOD PRESSURE: 78 MMHG | TEMPERATURE: 97.3 F | WEIGHT: 217 LBS | SYSTOLIC BLOOD PRESSURE: 128 MMHG | HEART RATE: 63 BPM

## 2024-05-02 DIAGNOSIS — I25.84 CORONARY ARTERY CALCIFICATION: ICD-10-CM

## 2024-05-02 DIAGNOSIS — E78.5 HYPERLIPIDEMIA, UNSPECIFIED HYPERLIPIDEMIA TYPE: ICD-10-CM

## 2024-05-02 DIAGNOSIS — N40.1 BENIGN PROSTATIC HYPERPLASIA WITH URINARY HESITANCY: ICD-10-CM

## 2024-05-02 DIAGNOSIS — I25.10 CORONARY ARTERY CALCIFICATION: ICD-10-CM

## 2024-05-02 DIAGNOSIS — R97.20 PSA ELEVATION: Primary | ICD-10-CM

## 2024-05-02 DIAGNOSIS — R39.11 BENIGN PROSTATIC HYPERPLASIA WITH URINARY HESITANCY: ICD-10-CM

## 2024-05-02 DIAGNOSIS — N32.89 BLADDER WALL THICKENING: ICD-10-CM

## 2024-05-02 DIAGNOSIS — K76.0 FATTY LIVER: ICD-10-CM

## 2024-05-02 PROCEDURE — 1160F RVW MEDS BY RX/DR IN RCRD: CPT | Performed by: FAMILY MEDICINE

## 2024-05-02 PROCEDURE — 99213 OFFICE O/P EST LOW 20 MIN: CPT | Performed by: FAMILY MEDICINE

## 2024-05-02 PROCEDURE — 1159F MED LIST DOCD IN RCRD: CPT | Performed by: FAMILY MEDICINE

## 2024-05-02 ASSESSMENT — ENCOUNTER SYMPTOMS
MUSCULOSKELETAL NEGATIVE: 1
RESPIRATORY NEGATIVE: 1
ENDOCRINE NEGATIVE: 1
CONSTITUTIONAL NEGATIVE: 1
EYES NEGATIVE: 1
LOSS OF SENSATION IN FEET: 0
CARDIOVASCULAR NEGATIVE: 1
OCCASIONAL FEELINGS OF UNSTEADINESS: 0
DEPRESSION: 0

## 2024-05-02 ASSESSMENT — PATIENT HEALTH QUESTIONNAIRE - PHQ9
1. LITTLE INTEREST OR PLEASURE IN DOING THINGS: NOT AT ALL
2. FEELING DOWN, DEPRESSED OR HOPELESS: NOT AT ALL
SUM OF ALL RESPONSES TO PHQ9 QUESTIONS 1 AND 2: 0
10. IF YOU CHECKED OFF ANY PROBLEMS, HOW DIFFICULT HAVE THESE PROBLEMS MADE IT FOR YOU TO DO YOUR WORK, TAKE CARE OF THINGS AT HOME, OR GET ALONG WITH OTHER PEOPLE: NOT DIFFICULT AT ALL

## 2024-05-02 NOTE — PATIENT INSTRUCTIONS
Reviewed scan with you today.  Some thickening of the bladder wall noted.  Going to have a follow-up with urologist Dr. Lawrence.  Also with elevation PSA important to follow-up with Dr. Lawrence.    Coronary artery calcifications noted on CT scoring of the coronary arteries going to have you do a maximal stress test and continue on statin therapy.    Fatty liver noted on CT scan would like for you to follow low-fat diet try to exercise 40 minutes 4 times weekly and recommend discontinuing alcohol intake.    Would like to recheck labs in 3 months

## 2024-05-02 NOTE — PROGRESS NOTES
"Subjective   Patient ID: Praful Hilton is a 64 y.o. male who presents for Follow-up (CT).    Patient presents for follow-up.  Patient had recent scan done showed some thickening of the bladder no evidence of cystitis noted on urinalysis.    Also has had elevation of the PSA level he is to follow-up with Dr. Lawrence.  Will likely need cystoscopy as well.    Patient has had no troubles with chest pain or shortness of breath was noted to have coronary artery calcifications and also was noted to have fatty steatosis.  He does drink some alcohol daily.             Review of Systems   Constitutional: Negative.    HENT: Negative.     Eyes: Negative.    Respiratory: Negative.     Cardiovascular: Negative.    Endocrine: Negative.    Genitourinary: Negative.    Musculoskeletal: Negative.    Skin: Negative.        Objective   /78   Pulse 63   Temp 36.3 °C (97.3 °F)   Ht 1.791 m (5' 10.5\")   Wt 98.4 kg (217 lb)   SpO2 98%   BMI 30.70 kg/m²   BSA Body surface area is 2.21 meters squared.      Physical Exam  Constitutional:       Appearance: Normal appearance.   HENT:      Head: Normocephalic.   Cardiovascular:      Rate and Rhythm: Normal rate and regular rhythm.      Pulses: Normal pulses.      Heart sounds: Normal heart sounds.   Pulmonary:      Effort: Pulmonary effort is normal.      Breath sounds: Normal breath sounds.   Abdominal:      General: Abdomen is flat.      Palpations: Abdomen is soft.   Musculoskeletal:      Cervical back: Normal range of motion and neck supple.   Neurological:      Mental Status: He is alert.       Office Visit on 01/19/2024   Component Date Value Ref Range Status    Urine Culture 01/19/2024 No growth   Final    POC Color, Urine 01/19/2024 Yellow  Straw, Yellow, Light-Yellow Final    POC Appearance, Urine 01/19/2024 Clear  Clear Final    POC Glucose, Urine 01/19/2024 NEGATIVE  NEGATIVE mg/dl Final    POC Bilirubin, Urine 01/19/2024 NEGATIVE  NEGATIVE Final    POC Ketones, Urine " 01/19/2024 NEGATIVE  NEGATIVE mg/dl Final    POC Specific Gravity, Urine 01/19/2024 1.025  1.005 - 1.035 Final    POC Blood, Urine 01/19/2024 NEGATIVE  NEGATIVE Final    POC PH, Urine 01/19/2024 6.0  No Reference Range Established PH Final    POC Protein, Urine 01/19/2024 NEGATIVE  NEGATIVE, 30 (1+) mg/dl Final    POC Urobilinogen, Urine 01/19/2024 0.2  0.2, 1.0 EU/DL Final    Poc Nitrite, Urine 01/19/2024 NEGATIVE  NEGATIVE Final    POC Leukocytes, Urine 01/19/2024 NEGATIVE  NEGATIVE Final   Lab on 10/31/2023   Component Date Value Ref Range Status    WBC 10/31/2023 5.5  4.4 - 11.3 x10*3/uL Final    nRBC 10/31/2023 0.0  0.0 - 0.0 /100 WBCs Final    RBC 10/31/2023 5.05  4.50 - 5.90 x10*6/uL Final    Hemoglobin 10/31/2023 15.1  13.5 - 17.5 g/dL Final    Hematocrit 10/31/2023 46.3  41.0 - 52.0 % Final    MCV 10/31/2023 92  80 - 100 fL Final    MCH 10/31/2023 29.9  26.0 - 34.0 pg Final    MCHC 10/31/2023 32.6  32.0 - 36.0 g/dL Final    RDW 10/31/2023 12.4  11.5 - 14.5 % Final    Platelets 10/31/2023 196  150 - 450 x10*3/uL Final    MPV 10/31/2023 12.0 (H)  7.5 - 11.5 fL Final    Neutrophils % 10/31/2023 63.5  40.0 - 80.0 % Final    Immature Granulocytes %, Automated 10/31/2023 0.4  0.0 - 0.9 % Final    Immature Granulocyte Count (IG) includes promyelocytes, myelocytes and metamyelocytes but does not include bands. Percent differential counts (%) should be interpreted in the context of the absolute cell counts (cells/UL).    Lymphocytes % 10/31/2023 23.9  13.0 - 44.0 % Final    Monocytes % 10/31/2023 10.8  2.0 - 10.0 % Final    Eosinophils % 10/31/2023 0.9  0.0 - 6.0 % Final    Basophils % 10/31/2023 0.5  0.0 - 2.0 % Final    Neutrophils Absolute 10/31/2023 3.51  1.20 - 7.70 x10*3/uL Final    Percent differential counts (%) should be interpreted in the context of the absolute cell counts (cells/uL).    Immature Granulocytes Absolute, Au* 10/31/2023 0.02  0.00 - 0.70 x10*3/uL Final    Lymphocytes Absolute 10/31/2023 1.32   1.20 - 4.80 x10*3/uL Final    Monocytes Absolute 10/31/2023 0.60  0.10 - 1.00 x10*3/uL Final    Eosinophils Absolute 10/31/2023 0.05  0.00 - 0.70 x10*3/uL Final    Basophils Absolute 10/31/2023 0.03  0.00 - 0.10 x10*3/uL Final    Glucose 10/31/2023 107 (H)  74 - 99 mg/dL Final    Sodium 10/31/2023 139  136 - 145 mmol/L Final    Potassium 10/31/2023 4.2  3.5 - 5.3 mmol/L Final    Chloride 10/31/2023 103  98 - 107 mmol/L Final    Bicarbonate 10/31/2023 23  21 - 32 mmol/L Final    Anion Gap 10/31/2023 17  10 - 20 mmol/L Final    Urea Nitrogen 10/31/2023 15  6 - 23 mg/dL Final    Creatinine 10/31/2023 0.93  0.50 - 1.30 mg/dL Final    eGFR 10/31/2023 >90  >60 mL/min/1.73m*2 Final    Calculations of estimated GFR are performed using the 2021 CKD-EPI Study Refit equation without the race variable for the IDMS-Traceable creatinine methods.  https://jasn.asnjournals.org/content/early/2021/09/22/ASN.1392844719    Calcium 10/31/2023 9.7  8.6 - 10.6 mg/dL Final    Albumin 10/31/2023 4.3  3.4 - 5.0 g/dL Final    Alkaline Phosphatase 10/31/2023 62  33 - 136 U/L Final    Total Protein 10/31/2023 7.1  6.4 - 8.2 g/dL Final    AST 10/31/2023 20  9 - 39 U/L Final    Bilirubin, Total 10/31/2023 0.8  0.0 - 1.2 mg/dL Final    ALT 10/31/2023 24  10 - 52 U/L Final    Patients treated with Sulfasalazine may generate falsely decreased results for ALT.    Thyroid Stimulating Hormone 10/31/2023 1.38  0.44 - 3.98 mIU/L Final    Cholesterol 10/31/2023 178  0 - 199 mg/dL Final          Age      Desirable   Borderline High   High     0-19 Y     0 - 169       170 - 199     >/= 200    20-24 Y     0 - 189       190 - 224     >/= 225         >24 Y     0 - 199       200 - 239     >/= 240   **All ranges are based on fasting samples. Specific   therapeutic targets will vary based on patient-specific   cardiac risk.    Pediatric guidelines reference:Pediatrics 2011, 128(S5).Adult guidelines reference: NCEP ATPIII Guidelines,MAXIM 2001,  258:2486-97    Venipuncture immediately after or during the administration of Metamizole may lead to falsely low results. Testing should be performed immediately prior to Metamizole dosing.    HDL-Cholesterol 10/31/2023 58.7  mg/dL Final      Age       Very Low   Low     Normal    High    0-19 Y    < 35      < 40     40-45     ----  20-24 Y    ----     < 40      >45      ----        >24 Y      ----     < 40     40-60      >60      Cholesterol/HDL Ratio 10/31/2023 3.0   Final      Ref Values  Desirable  < 3.4  High Risk  > 5.0    LDL Calculated 10/31/2023 101 (H)  <=99 mg/dL Final                                Near   Borderline      AGE      Desirable  Optimal    High     High     Very High     0-19 Y     0 - 109     ---    110-129   >/= 130     ----    20-24 Y     0 - 119     ---    120-159   >/= 160     ----      >24 Y     0 -  99   100-129  130-159   160-189     >/=190      VLDL 10/31/2023 19  0 - 40 mg/dL Final    Triglycerides 10/31/2023 93  0 - 149 mg/dL Final       Age         Desirable   Borderline High   High     Very High   0 D-90 D    19 - 174         ----         ----        ----  91 D- 9 Y     0 -  74        75 -  99     >/= 100      ----    10-19 Y     0 -  89        90 - 129     >/= 130      ----    20-24 Y     0 - 114       115 - 149     >/= 150      ----         >24 Y     0 - 149       150 - 199    200- 499    >/= 500    Venipuncture immediately after or during the administration of Metamizole may lead to falsely low results. Testing should be performed immediately prior to Metamizole dosing.    Non HDL Cholesterol 10/31/2023 119  0 - 149 mg/dL Final          Age       Desirable   Borderline High   High     Very High     0-19 Y     0 - 119       120 - 144     >/= 145    >/= 160    20-24 Y     0 - 149       150 - 189     >/= 190      ----         >24 Y    30 mg/dL above LDL Cholesterol goal      Prostate Specific AG 10/31/2023 4.86 (H)  <=4.00 ng/mL Final     Current Outpatient Medications on File  Prior to Visit   Medication Sig Dispense Refill    simvastatin (Zocor) 40 mg tablet TAKE 1 TABLET DAILY 90 tablet 3    [DISCONTINUED] ciprofloxacin (Cipro) 500 mg tablet Take 1 tablet (500 mg) by mouth 2 times a day.      [DISCONTINUED] metroNIDAZOLE (Flagyl) 500 mg tablet Take 1 tablet (500 mg) by mouth 3 times a day.       No current facility-administered medications on file prior to visit.     No images are attached to the encounter.            Assessment/Plan   Problem List Items Addressed This Visit             ICD-10-CM    BPH (benign prostatic hyperplasia) N40.0    PSA elevation - Primary R97.20     Important to follow-up with urology         Bladder wall thickening N32.89     Going to have you see urologist regarding bladder wall thickening         Coronary artery calcification I25.10, I25.84     Continue statin therapy going to do maximal stress test         Fatty liver K76.0     Recommend discontinue all alcohol try to exercise 40 minutes 4 times weekly follow low-fat diet

## 2024-05-30 ENCOUNTER — HOSPITAL ENCOUNTER (OUTPATIENT)
Dept: CARDIOLOGY | Facility: CLINIC | Age: 64
Discharge: HOME | End: 2024-05-30
Payer: COMMERCIAL

## 2024-05-30 DIAGNOSIS — I25.84 CORONARY ARTERY CALCIFICATION: ICD-10-CM

## 2024-05-30 DIAGNOSIS — E78.5 HYPERLIPIDEMIA, UNSPECIFIED HYPERLIPIDEMIA TYPE: ICD-10-CM

## 2024-05-30 DIAGNOSIS — I25.10 CORONARY ARTERY CALCIFICATION: ICD-10-CM

## 2024-05-30 PROCEDURE — 93018 CV STRESS TEST I&R ONLY: CPT | Performed by: INTERNAL MEDICINE

## 2024-05-30 PROCEDURE — 93016 CV STRESS TEST SUPVJ ONLY: CPT | Performed by: INTERNAL MEDICINE

## 2024-05-30 PROCEDURE — 93017 CV STRESS TEST TRACING ONLY: CPT

## 2024-07-23 ENCOUNTER — APPOINTMENT (OUTPATIENT)
Dept: AUDIOLOGY | Facility: CLINIC | Age: 64
End: 2024-07-23

## 2024-07-23 DIAGNOSIS — H90.3 BILATERAL SENSORINEURAL HEARING LOSS: Primary | ICD-10-CM

## 2024-07-23 PROCEDURE — V5160 DISPENSING FEE BINAURAL: HCPCS | Performed by: AUDIOLOGIST

## 2024-07-23 PROCEDURE — V5261 HEARING AID, DIGIT, BIN, BTE: HCPCS | Performed by: AUDIOLOGIST

## 2024-07-23 NOTE — PROGRESS NOTES
[facility]  HEARING AID FITTING      Name:  Praful Hilton  YOB: 1960  Today's date:  07/23/24      DEVICES FIT TODAY  The patient was fit with binaural Phonak Audeo L50-RL hearing aids.    VERIFICATION MEASUREMENTS/OBJECTIVE RESULTS  Excellent match to VSM target with no adjustment needed.    PATIENT REPORT/SUBJECTIVE RESULTS   Good subjective audibility noted in the office.    CONNECTIVITY  Paired the hearing aids to the patient's iPhone for streaming but was unable to download the Nimbus Data Eamon as the patient could not remember his password.  Gave the patient the connectivity guide for when he is able to reset his password.    PATIENT INSTRUCTION  Feedback manager was set and no feedback was observed.  Indicator tones were demonstrated for the patient.  Care and maintenance of the device were discussed with the patient.      The patient was able to properly insert and remove the hearing instrument from the ear.      In addition to today's verbal instruction of the hearing devices, the patient was given written instructions from the hearing aid . Hearing aid limitations were discussed at length as well as realistic expectations.  The patient was advised in order to receive full benefit of amplification, consistent use during all waking hours is recommended.     POTENTIAL FOLLOW UP CONCERNS TO ADDRESS   We will want to follow up on Eamon usage once the patient is able to download the Nimbus Data Eamon.    FOLLOW UP    2-3 weeks    APPOINTMENT TIME  3:30-4:30pm    Walt Wade  Doctor of Audiology  Senior Audiologist

## 2024-07-26 ENCOUNTER — APPOINTMENT (OUTPATIENT)
Dept: LAB | Facility: LAB | Age: 64
End: 2024-07-26
Payer: COMMERCIAL

## 2024-07-29 ENCOUNTER — LAB (OUTPATIENT)
Dept: LAB | Facility: LAB | Age: 64
End: 2024-07-29
Payer: COMMERCIAL

## 2024-07-29 DIAGNOSIS — I25.84 CORONARY ARTERY CALCIFICATION: ICD-10-CM

## 2024-07-29 DIAGNOSIS — E78.5 HYPERLIPIDEMIA, UNSPECIFIED HYPERLIPIDEMIA TYPE: ICD-10-CM

## 2024-07-29 DIAGNOSIS — I25.10 CORONARY ARTERY CALCIFICATION: ICD-10-CM

## 2024-07-29 PROCEDURE — 80053 COMPREHEN METABOLIC PANEL: CPT

## 2024-07-29 PROCEDURE — 36415 COLL VENOUS BLD VENIPUNCTURE: CPT

## 2024-07-29 PROCEDURE — 80061 LIPID PANEL: CPT

## 2024-07-30 ENCOUNTER — CLINICAL SUPPORT (OUTPATIENT)
Dept: AUDIOLOGY | Facility: CLINIC | Age: 64
End: 2024-07-30

## 2024-07-30 DIAGNOSIS — H90.3 BILATERAL SENSORINEURAL HEARING LOSS: Primary | ICD-10-CM

## 2024-07-30 LAB
ALBUMIN SERPL BCP-MCNC: 4.4 G/DL (ref 3.4–5)
ALP SERPL-CCNC: 61 U/L (ref 33–136)
ALT SERPL W P-5'-P-CCNC: 16 U/L (ref 10–52)
ANION GAP SERPL CALC-SCNC: 17 MMOL/L (ref 10–20)
AST SERPL W P-5'-P-CCNC: 15 U/L (ref 9–39)
BILIRUB SERPL-MCNC: 0.8 MG/DL (ref 0–1.2)
BUN SERPL-MCNC: 20 MG/DL (ref 6–23)
CALCIUM SERPL-MCNC: 9.7 MG/DL (ref 8.6–10.6)
CHLORIDE SERPL-SCNC: 103 MMOL/L (ref 98–107)
CHOLEST SERPL-MCNC: 207 MG/DL (ref 0–199)
CHOLESTEROL/HDL RATIO: 4
CO2 SERPL-SCNC: 23 MMOL/L (ref 21–32)
CREAT SERPL-MCNC: 0.93 MG/DL (ref 0.5–1.3)
EGFRCR SERPLBLD CKD-EPI 2021: >90 ML/MIN/1.73M*2
GLUCOSE SERPL-MCNC: 90 MG/DL (ref 74–99)
HDLC SERPL-MCNC: 52.4 MG/DL
LDLC SERPL CALC-MCNC: 138 MG/DL
NON HDL CHOLESTEROL: 155 MG/DL (ref 0–149)
POTASSIUM SERPL-SCNC: 4.5 MMOL/L (ref 3.5–5.3)
PROT SERPL-MCNC: 7.1 G/DL (ref 6.4–8.2)
SODIUM SERPL-SCNC: 138 MMOL/L (ref 136–145)
TRIGL SERPL-MCNC: 84 MG/DL (ref 0–149)
VLDL: 17 MG/DL (ref 0–40)

## 2024-07-30 PROCEDURE — HRANC PR HEARING AID NO CHARGE: Performed by: AUDIOLOGIST

## 2024-07-30 NOTE — PROGRESS NOTES
[facility]  HEARING AID CHECK      Name:  Praful Hilton  YOB: 1960  Today's date:  07/30/24      PATIENT ISSUES/CONCERNS  New left aid makes the patient's ear feel plugged up.  Right aid is good.    HEARING AID INSPECTION AND ADJUSTMENT  Switched the left aid to a small power dome and gave extras in case he wishes to switch the right aid as well.  Good subjective audibility noted in the office.    FOLLOW UP   We will follow up again in 2 weeks as previously scheduled.    APPOINTMENT TIME  4:00pm-4:30pm    Walt Wade  Doctor of Audiology  Senior Audiologist

## 2024-08-09 ENCOUNTER — APPOINTMENT (OUTPATIENT)
Dept: PRIMARY CARE | Facility: CLINIC | Age: 64
End: 2024-08-09
Payer: COMMERCIAL

## 2024-08-09 VITALS
BODY MASS INDEX: 29.56 KG/M2 | TEMPERATURE: 97.6 F | OXYGEN SATURATION: 96 % | DIASTOLIC BLOOD PRESSURE: 79 MMHG | HEART RATE: 66 BPM | WEIGHT: 209 LBS | SYSTOLIC BLOOD PRESSURE: 130 MMHG

## 2024-08-09 DIAGNOSIS — K76.0 FATTY LIVER: Primary | ICD-10-CM

## 2024-08-09 DIAGNOSIS — I25.84 CORONARY ARTERY CALCIFICATION: ICD-10-CM

## 2024-08-09 DIAGNOSIS — I25.10 CORONARY ARTERY CALCIFICATION: ICD-10-CM

## 2024-08-09 PROCEDURE — 99213 OFFICE O/P EST LOW 20 MIN: CPT | Performed by: FAMILY MEDICINE

## 2024-08-09 PROCEDURE — 1159F MED LIST DOCD IN RCRD: CPT | Performed by: FAMILY MEDICINE

## 2024-08-09 RX ORDER — ROSUVASTATIN CALCIUM 20 MG/1
20 TABLET, COATED ORAL DAILY
Qty: 100 TABLET | Refills: 3 | Status: SHIPPED | OUTPATIENT
Start: 2024-08-09 | End: 2025-09-13

## 2024-08-09 NOTE — PROGRESS NOTES
Subjective   Patient ID: Praful Hilton is a 64 y.o. male who presents for Follow-up (On labs).    Patient presents for follow-up he is exercising regularly.    Blood pressure doing well.    Cholesterol still not at goal.  He has had no troubles with chest pain or shortness of breath no dizziness no lightheadedness.         Review of Systems   Constitutional: Negative.    HENT: Negative.     Eyes: Negative.    Respiratory: Negative.     Cardiovascular: Negative.    Gastrointestinal: Negative.        Objective   /79   Pulse 66   Temp 36.4 °C (97.6 °F)   Wt 94.8 kg (209 lb)   SpO2 96%   BMI 29.56 kg/m²   BSA Body surface area is 2.17 meters squared.      Physical Exam  Constitutional:       Appearance: Normal appearance.   HENT:      Head: Normocephalic and atraumatic.      Nose: Nose normal.   Eyes:      Pupils: Pupils are equal, round, and reactive to light.   Cardiovascular:      Rate and Rhythm: Normal rate and regular rhythm.   Pulmonary:      Effort: Pulmonary effort is normal.      Breath sounds: Normal breath sounds.   Abdominal:      General: Abdomen is flat.      Palpations: Abdomen is soft.   Musculoskeletal:      Cervical back: No rigidity.   Neurological:      Mental Status: He is alert.       Lab on 07/29/2024   Component Date Value Ref Range Status    Glucose 07/29/2024 90  74 - 99 mg/dL Final    Sodium 07/29/2024 138  136 - 145 mmol/L Final    Potassium 07/29/2024 4.5  3.5 - 5.3 mmol/L Final    Chloride 07/29/2024 103  98 - 107 mmol/L Final    Bicarbonate 07/29/2024 23  21 - 32 mmol/L Final    Anion Gap 07/29/2024 17  10 - 20 mmol/L Final    Urea Nitrogen 07/29/2024 20  6 - 23 mg/dL Final    Creatinine 07/29/2024 0.93  0.50 - 1.30 mg/dL Final    eGFR 07/29/2024 >90  >60 mL/min/1.73m*2 Final    Calculations of estimated GFR are performed using the 2021 CKD-EPI Study Refit equation without the race variable for the IDMS-Traceable creatinine  methods.  https://jasn.asnjournals.org/content/early/2021/09/22/ASN.8216810614    Calcium 07/29/2024 9.7  8.6 - 10.6 mg/dL Final    Albumin 07/29/2024 4.4  3.4 - 5.0 g/dL Final    Alkaline Phosphatase 07/29/2024 61  33 - 136 U/L Final    Total Protein 07/29/2024 7.1  6.4 - 8.2 g/dL Final    AST 07/29/2024 15  9 - 39 U/L Final    Bilirubin, Total 07/29/2024 0.8  0.0 - 1.2 mg/dL Final    ALT 07/29/2024 16  10 - 52 U/L Final    Patients treated with Sulfasalazine may generate falsely decreased results for ALT.    Cholesterol 07/29/2024 207 (H)  0 - 199 mg/dL Final          Age      Desirable   Borderline High   High     0-19 Y     0 - 169       170 - 199     >/= 200    20-24 Y     0 - 189       190 - 224     >/= 225         >24 Y     0 - 199       200 - 239     >/= 240   **All ranges are based on fasting samples. Specific   therapeutic targets will vary based on patient-specific   cardiac risk.    Pediatric guidelines reference:Pediatrics 2011, 128(S5).Adult guidelines reference: NCEP ATPIII Guidelines,MAXIM 2001, 258:2486-97    Venipuncture immediately after or during the administration of Metamizole may lead to falsely low results. Testing should be performed immediately prior to Metamizole dosing.    HDL-Cholesterol 07/29/2024 52.4  mg/dL Final      Age       Very Low   Low     Normal    High    0-19 Y    < 35      < 40     40-45     ----  20-24 Y    ----     < 40      >45      ----        >24 Y      ----     < 40     40-60      >60      Cholesterol/HDL Ratio 07/29/2024 4.0   Final      Ref Values  Desirable  < 3.4  High Risk  > 5.0    LDL Calculated 07/29/2024 138 (H)  <=99 mg/dL Final                                Near   Borderline      AGE      Desirable  Optimal    High     High     Very High     0-19 Y     0 - 109     ---    110-129   >/= 130     ----    20-24 Y     0 - 119     ---    120-159   >/= 160     ----      >24 Y     0 -  99   100-129  130-159   160-189     >/=190      VLDL 07/29/2024 17  0 - 40 mg/dL  Final    Triglycerides 07/29/2024 84  0 - 149 mg/dL Final       Age         Desirable   Borderline High   High     Very High   0 D-90 D    19 - 174         ----         ----        ----  91 D- 9 Y     0 -  74        75 -  99     >/= 100      ----    10-19 Y     0 -  89        90 - 129     >/= 130      ----    20-24 Y     0 - 114       115 - 149     >/= 150      ----         >24 Y     0 - 149       150 - 199    200- 499    >/= 500    Venipuncture immediately after or during the administration of Metamizole may lead to falsely low results. Testing should be performed immediately prior to Metamizole dosing.    Non HDL Cholesterol 07/29/2024 155 (H)  0 - 149 mg/dL Final          Age       Desirable   Borderline High   High     Very High     0-19 Y     0 - 119       120 - 144     >/= 145    >/= 160    20-24 Y     0 - 149       150 - 189     >/= 190      ----         >24 Y    30 mg/dL above LDL Cholesterol goal     Office Visit on 01/19/2024   Component Date Value Ref Range Status    Urine Culture 01/19/2024 No growth   Final    POC Color, Urine 01/19/2024 Yellow  Straw, Yellow, Light-Yellow Final    POC Appearance, Urine 01/19/2024 Clear  Clear Final    POC Glucose, Urine 01/19/2024 NEGATIVE  NEGATIVE mg/dl Final    POC Bilirubin, Urine 01/19/2024 NEGATIVE  NEGATIVE Final    POC Ketones, Urine 01/19/2024 NEGATIVE  NEGATIVE mg/dl Final    POC Specific Gravity, Urine 01/19/2024 1.025  1.005 - 1.035 Final    POC Blood, Urine 01/19/2024 NEGATIVE  NEGATIVE Final    POC PH, Urine 01/19/2024 6.0  No Reference Range Established PH Final    POC Protein, Urine 01/19/2024 NEGATIVE  NEGATIVE, 30 (1+) mg/dl Final    POC Urobilinogen, Urine 01/19/2024 0.2  0.2, 1.0 EU/DL Final    Poc Nitrite, Urine 01/19/2024 NEGATIVE  NEGATIVE Final    POC Leukocytes, Urine 01/19/2024 NEGATIVE  NEGATIVE Final   Lab on 10/31/2023   Component Date Value Ref Range Status    WBC 10/31/2023 5.5  4.4 - 11.3 x10*3/uL Final    nRBC 10/31/2023 0.0  0.0 - 0.0  /100 WBCs Final    RBC 10/31/2023 5.05  4.50 - 5.90 x10*6/uL Final    Hemoglobin 10/31/2023 15.1  13.5 - 17.5 g/dL Final    Hematocrit 10/31/2023 46.3  41.0 - 52.0 % Final    MCV 10/31/2023 92  80 - 100 fL Final    MCH 10/31/2023 29.9  26.0 - 34.0 pg Final    MCHC 10/31/2023 32.6  32.0 - 36.0 g/dL Final    RDW 10/31/2023 12.4  11.5 - 14.5 % Final    Platelets 10/31/2023 196  150 - 450 x10*3/uL Final    MPV 10/31/2023 12.0 (H)  7.5 - 11.5 fL Final    Neutrophils % 10/31/2023 63.5  40.0 - 80.0 % Final    Immature Granulocytes %, Automated 10/31/2023 0.4  0.0 - 0.9 % Final    Immature Granulocyte Count (IG) includes promyelocytes, myelocytes and metamyelocytes but does not include bands. Percent differential counts (%) should be interpreted in the context of the absolute cell counts (cells/UL).    Lymphocytes % 10/31/2023 23.9  13.0 - 44.0 % Final    Monocytes % 10/31/2023 10.8  2.0 - 10.0 % Final    Eosinophils % 10/31/2023 0.9  0.0 - 6.0 % Final    Basophils % 10/31/2023 0.5  0.0 - 2.0 % Final    Neutrophils Absolute 10/31/2023 3.51  1.20 - 7.70 x10*3/uL Final    Percent differential counts (%) should be interpreted in the context of the absolute cell counts (cells/uL).    Immature Granulocytes Absolute, Au* 10/31/2023 0.02  0.00 - 0.70 x10*3/uL Final    Lymphocytes Absolute 10/31/2023 1.32  1.20 - 4.80 x10*3/uL Final    Monocytes Absolute 10/31/2023 0.60  0.10 - 1.00 x10*3/uL Final    Eosinophils Absolute 10/31/2023 0.05  0.00 - 0.70 x10*3/uL Final    Basophils Absolute 10/31/2023 0.03  0.00 - 0.10 x10*3/uL Final    Glucose 10/31/2023 107 (H)  74 - 99 mg/dL Final    Sodium 10/31/2023 139  136 - 145 mmol/L Final    Potassium 10/31/2023 4.2  3.5 - 5.3 mmol/L Final    Chloride 10/31/2023 103  98 - 107 mmol/L Final    Bicarbonate 10/31/2023 23  21 - 32 mmol/L Final    Anion Gap 10/31/2023 17  10 - 20 mmol/L Final    Urea Nitrogen 10/31/2023 15  6 - 23 mg/dL Final    Creatinine 10/31/2023 0.93  0.50 - 1.30 mg/dL Final     eGFR 10/31/2023 >90  >60 mL/min/1.73m*2 Final    Calculations of estimated GFR are performed using the 2021 CKD-EPI Study Refit equation without the race variable for the IDMS-Traceable creatinine methods.  https://jasn.asnjournals.org/content/early/2021/09/22/ASN.5443764557    Calcium 10/31/2023 9.7  8.6 - 10.6 mg/dL Final    Albumin 10/31/2023 4.3  3.4 - 5.0 g/dL Final    Alkaline Phosphatase 10/31/2023 62  33 - 136 U/L Final    Total Protein 10/31/2023 7.1  6.4 - 8.2 g/dL Final    AST 10/31/2023 20  9 - 39 U/L Final    Bilirubin, Total 10/31/2023 0.8  0.0 - 1.2 mg/dL Final    ALT 10/31/2023 24  10 - 52 U/L Final    Patients treated with Sulfasalazine may generate falsely decreased results for ALT.    Thyroid Stimulating Hormone 10/31/2023 1.38  0.44 - 3.98 mIU/L Final    Cholesterol 10/31/2023 178  0 - 199 mg/dL Final          Age      Desirable   Borderline High   High     0-19 Y     0 - 169       170 - 199     >/= 200    20-24 Y     0 - 189       190 - 224     >/= 225         >24 Y     0 - 199       200 - 239     >/= 240   **All ranges are based on fasting samples. Specific   therapeutic targets will vary based on patient-specific   cardiac risk.    Pediatric guidelines reference:Pediatrics 2011, 128(S5).Adult guidelines reference: NCEP ATPIII Guidelines,MAXIM 2001, 258:2486-97    Venipuncture immediately after or during the administration of Metamizole may lead to falsely low results. Testing should be performed immediately prior to Metamizole dosing.    HDL-Cholesterol 10/31/2023 58.7  mg/dL Final      Age       Very Low   Low     Normal    High    0-19 Y    < 35      < 40     40-45     ----  20-24 Y    ----     < 40      >45      ----        >24 Y      ----     < 40     40-60      >60      Cholesterol/HDL Ratio 10/31/2023 3.0   Final      Ref Values  Desirable  < 3.4  High Risk  > 5.0    LDL Calculated 10/31/2023 101 (H)  <=99 mg/dL Final                                Near   Borderline      AGE       Desirable  Optimal    High     High     Very High     0-19 Y     0 - 109     ---    110-129   >/= 130     ----    20-24 Y     0 - 119     ---    120-159   >/= 160     ----      >24 Y     0 -  99   100-129  130-159   160-189     >/=190      VLDL 10/31/2023 19  0 - 40 mg/dL Final    Triglycerides 10/31/2023 93  0 - 149 mg/dL Final       Age         Desirable   Borderline High   High     Very High   0 D-90 D    19 - 174         ----         ----        ----  91 D- 9 Y     0 -  74        75 -  99     >/= 100      ----    10-19 Y     0 -  89        90 - 129     >/= 130      ----    20-24 Y     0 - 114       115 - 149     >/= 150      ----         >24 Y     0 - 149       150 - 199    200- 499    >/= 500    Venipuncture immediately after or during the administration of Metamizole may lead to falsely low results. Testing should be performed immediately prior to Metamizole dosing.    Non HDL Cholesterol 10/31/2023 119  0 - 149 mg/dL Final          Age       Desirable   Borderline High   High     Very High     0-19 Y     0 - 119       120 - 144     >/= 145    >/= 160    20-24 Y     0 - 149       150 - 189     >/= 190      ----         >24 Y    30 mg/dL above LDL Cholesterol goal      Prostate Specific AG 10/31/2023 4.86 (H)  <=4.00 ng/mL Final     Current Outpatient Medications on File Prior to Visit   Medication Sig Dispense Refill    simvastatin (Zocor) 40 mg tablet TAKE 1 TABLET DAILY 90 tablet 3     No current facility-administered medications on file prior to visit.     No images are attached to the encounter.            Assessment/Plan   Problem List Items Addressed This Visit             ICD-10-CM    Coronary artery calcification I25.10, I25.84    Relevant Medications    rosuvastatin (Crestor) 20 mg tablet    Other Relevant Orders    Comprehensive Metabolic Panel    CK    Sedimentation Rate    C-reactive protein    Fatty liver - Primary K76.0    Relevant Orders    Comprehensive Metabolic Panel    CK    Sedimentation  Rate    C-reactive protein

## 2024-08-12 ASSESSMENT — ENCOUNTER SYMPTOMS
RESPIRATORY NEGATIVE: 1
CONSTITUTIONAL NEGATIVE: 1
GASTROINTESTINAL NEGATIVE: 1
EYES NEGATIVE: 1
CARDIOVASCULAR NEGATIVE: 1

## 2024-08-13 ENCOUNTER — APPOINTMENT (OUTPATIENT)
Dept: AUDIOLOGY | Facility: CLINIC | Age: 64
End: 2024-08-13
Payer: COMMERCIAL

## 2024-08-22 ENCOUNTER — APPOINTMENT (OUTPATIENT)
Dept: AUDIOLOGY | Facility: CLINIC | Age: 64
End: 2024-08-22

## 2024-08-22 DIAGNOSIS — H93.13 SUBJECTIVE TINNITUS OF BOTH EARS: ICD-10-CM

## 2024-08-22 DIAGNOSIS — H90.3 BILATERAL SENSORINEURAL HEARING LOSS: Primary | ICD-10-CM

## 2024-08-22 PROCEDURE — HRANC PR HEARING AID NO CHARGE: Performed by: AUDIOLOGIST

## 2024-08-22 NOTE — PROGRESS NOTES
[facility]  HEARING AID CHECK      Name:  Praful Hilton  YOB: 1960  Today's date:  08/22/24      PATIENT ISSUES/CONCERNS AND OTOSCOPIC RESULTS  Otoscopic was mostly clear.  Patient reports that the hearing aids have been working very well and he much prefers the small power domes.    HEARING AID INSPECTION AND ADJUSTMENT  Hearing aids were well maintained.  Gave the patient extra small power domes.  Listening check was good.  Analyzer data good.      Patient reports no issues or concerns.  No need for adjustment today.    FOLLOW UP   The patient was asked to contact the office if they notice any significant change in hearing level or hearing aid function.    Otherwise, will follow up again in 6 months with a hearing test.    APPOINTMENT TIME  4:00pm-4:30pm    Walt Waed  Doctor of Audiology  Senior Audiologist

## 2024-11-21 ENCOUNTER — DOCUMENTATION (OUTPATIENT)
Dept: AUDIOLOGY | Facility: CLINIC | Age: 64
End: 2024-11-21
Payer: COMMERCIAL
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
## 2024-11-26 ENCOUNTER — CLINICAL SUPPORT (OUTPATIENT)
Dept: AUDIOLOGY | Facility: CLINIC | Age: 64
End: 2024-11-26

## 2024-11-26 DIAGNOSIS — H90.3 BILATERAL SENSORINEURAL HEARING LOSS: Primary | ICD-10-CM

## 2024-11-26 PROCEDURE — HRANC PR HEARING AID NO CHARGE: Performed by: AUDIOLOGIST

## 2024-11-26 NOTE — PROGRESS NOTES
[facility]  WALK IN  HEARING AID CHECK      Name:  Praful Hilton  YOB: 1960  Today's date:  11/26/24    Left aid dead.  Inspection reveals a bad .    Replaced under warranty with resulting good listening check.    Called patient and LM on  to .  No charge.        Fuentes Wade.  Doctor of Audiology  Senior Audiologist

## 2024-12-18 ENCOUNTER — CLINICAL SUPPORT (OUTPATIENT)
Dept: AUDIOLOGY | Facility: CLINIC | Age: 64
End: 2024-12-18
Payer: COMMERCIAL

## 2024-12-18 DIAGNOSIS — H90.3 BILATERAL SENSORINEURAL HEARING LOSS: Primary | ICD-10-CM

## 2024-12-18 PROCEDURE — HRANC PR HEARING AID NO CHARGE: Performed by: AUDIOLOGIST

## 2024-12-18 NOTE — PROGRESS NOTES
[facility]  WALK IN  HEARING AID CHECK      Name:  Praful Hilton  YOB: 1960  Today's date:  12/18/24    Right aid dead.  Replaced  with resulting good listening check.    Called patient and LM on  to .  No charge under warranty.        Fuentes Wade.  Doctor of Audiology  Senior Audiologist

## 2025-02-24 ENCOUNTER — APPOINTMENT (OUTPATIENT)
Dept: AUDIOLOGY | Facility: CLINIC | Age: 65
End: 2025-02-24
Payer: COMMERCIAL

## 2025-03-27 ENCOUNTER — APPOINTMENT (OUTPATIENT)
Dept: AUDIOLOGY | Facility: CLINIC | Age: 65
End: 2025-03-27
Payer: COMMERCIAL

## 2025-03-27 DIAGNOSIS — H90.3 BILATERAL SENSORINEURAL HEARING LOSS: Primary | ICD-10-CM

## 2025-03-27 DIAGNOSIS — H93.13 SUBJECTIVE TINNITUS OF BOTH EARS: ICD-10-CM

## 2025-03-27 PROCEDURE — 92557 COMPREHENSIVE HEARING TEST: CPT | Performed by: AUDIOLOGIST

## 2025-03-27 NOTE — PROGRESS NOTES
[facility]  YEARLY HEARING AID APPOINTMENT      Name:  Praful Hilton  YOB: 1960  Today's date:  03/27/25      AUDIOMETRIC RESULTS  Otoscopic was mostly clear.  Audiogram revealed a stable normal to severe sensorineural hearing loss.    HEARING AID INSPECTION AND ADJUSTMENT  Hearing aids were cleaned and checked.  Replaced filters, domes and sport locks.  Listening check was good.  Analyzer data good.      Patient reports no issues or concerns.  No need for adjustment today.    FOLLOW UP   The patient was asked to contact the office if they notice any significant change in hearing level or hearing aid function.    Otherwise, will follow up again:  6 month - 30 minute Hearing Aid Check with [facility]    CHARGES  No charge under warranty    APPOINTMENT TIME  8:00am-9:00am    Walt Wade  Doctor of Audiology  Senior Audiologist

## 2025-04-03 ENCOUNTER — CLINICAL SUPPORT (OUTPATIENT)
Dept: AUDIOLOGY | Facility: CLINIC | Age: 65
End: 2025-04-03

## 2025-04-03 DIAGNOSIS — H90.3 BILATERAL SENSORINEURAL HEARING LOSS: Primary | ICD-10-CM

## 2025-04-03 PROCEDURE — HRANC PR HEARING AID NO CHARGE: Performed by: AUDIOLOGIST

## 2025-04-03 NOTE — PROGRESS NOTES
[facility]  WALK IN  HEARING AID CHECK      Name:  Praful Hilton  YOB: 1960  Today's date:  04/03/25    Right aid dead.  Bad .    Replaced the  with one in stock under warranty.  Good listening check noted in the office.    CHARGES  No charge under warranty    Fuentes Wade.  Doctor of Audiology  Senior Audiologist

## 2025-10-13 ENCOUNTER — APPOINTMENT (OUTPATIENT)
Dept: AUDIOLOGY | Facility: CLINIC | Age: 65
End: 2025-10-13
Payer: COMMERCIAL